# Patient Record
Sex: MALE | Race: WHITE | HISPANIC OR LATINO | Employment: FULL TIME | URBAN - METROPOLITAN AREA
[De-identification: names, ages, dates, MRNs, and addresses within clinical notes are randomized per-mention and may not be internally consistent; named-entity substitution may affect disease eponyms.]

---

## 2017-09-06 ENCOUNTER — HOSPITAL ENCOUNTER (OUTPATIENT)
Dept: LAB | Facility: MEDICAL CENTER | Age: 52
End: 2017-09-06
Attending: FAMILY MEDICINE
Payer: COMMERCIAL

## 2017-09-06 LAB
ALBUMIN SERPL BCP-MCNC: 3.8 G/DL (ref 3.2–4.9)
ALBUMIN/GLOB SERPL: 1 G/DL
ALP SERPL-CCNC: 50 U/L (ref 30–99)
ALT SERPL-CCNC: 18 U/L (ref 2–50)
ANION GAP SERPL CALC-SCNC: 8 MMOL/L (ref 0–11.9)
AST SERPL-CCNC: 21 U/L (ref 12–45)
BILIRUB SERPL-MCNC: 0.8 MG/DL (ref 0.1–1.5)
BUN SERPL-MCNC: 10 MG/DL (ref 8–22)
CALCIUM SERPL-MCNC: 9.5 MG/DL (ref 8.5–10.5)
CHLORIDE SERPL-SCNC: 106 MMOL/L (ref 96–112)
CHOLEST SERPL-MCNC: 147 MG/DL (ref 100–199)
CO2 SERPL-SCNC: 26 MMOL/L (ref 20–33)
CREAT SERPL-MCNC: 0.86 MG/DL (ref 0.5–1.4)
EST. AVERAGE GLUCOSE BLD GHB EST-MCNC: 114 MG/DL
GFR SERPL CREATININE-BSD FRML MDRD: >60 ML/MIN/1.73 M 2
GLOBULIN SER CALC-MCNC: 3.7 G/DL (ref 1.9–3.5)
GLUCOSE SERPL-MCNC: 82 MG/DL (ref 65–99)
HBA1C MFR BLD: 5.6 % (ref 0–5.6)
HDLC SERPL-MCNC: 58 MG/DL
LDLC SERPL CALC-MCNC: 76 MG/DL
POTASSIUM SERPL-SCNC: 4 MMOL/L (ref 3.6–5.5)
PROT SERPL-MCNC: 7.5 G/DL (ref 6–8.2)
SODIUM SERPL-SCNC: 140 MMOL/L (ref 135–145)
TRIGL SERPL-MCNC: 65 MG/DL (ref 0–149)

## 2017-09-06 PROCEDURE — 80053 COMPREHEN METABOLIC PANEL: CPT

## 2017-09-06 PROCEDURE — 80061 LIPID PANEL: CPT

## 2017-09-06 PROCEDURE — 36415 COLL VENOUS BLD VENIPUNCTURE: CPT

## 2017-09-06 PROCEDURE — 83036 HEMOGLOBIN GLYCOSYLATED A1C: CPT

## 2017-10-09 ENCOUNTER — HOSPITAL ENCOUNTER (OUTPATIENT)
Dept: RADIOLOGY | Facility: MEDICAL CENTER | Age: 52
End: 2017-10-09
Attending: FAMILY MEDICINE
Payer: COMMERCIAL

## 2017-10-09 DIAGNOSIS — J30.1 ACUTE SEASONAL ALLERGIC RHINITIS DUE TO POLLEN: ICD-10-CM

## 2017-10-09 DIAGNOSIS — M50.30 DEGENERATION OF CERVICAL INTERVERTEBRAL DISC: ICD-10-CM

## 2017-10-09 DIAGNOSIS — N40.1 BENIGN LOCALIZED HYPERPLASIA OF PROSTATE WITH URINARY OBSTRUCTION: ICD-10-CM

## 2017-10-09 DIAGNOSIS — E78.00 HYPERCHOLESTEROLEMIA: ICD-10-CM

## 2017-10-09 DIAGNOSIS — K64.8 INTERNAL HEMORRHOIDS: ICD-10-CM

## 2017-10-09 DIAGNOSIS — K21.9 GASTROESOPHAGEAL REFLUX DISEASE WITHOUT ESOPHAGITIS: ICD-10-CM

## 2017-10-09 DIAGNOSIS — N13.8 BENIGN LOCALIZED HYPERPLASIA OF PROSTATE WITH URINARY OBSTRUCTION: ICD-10-CM

## 2017-10-09 DIAGNOSIS — E55.9 VITAMIN D DEFICIENCY: ICD-10-CM

## 2017-10-09 DIAGNOSIS — Z83.3 FHX: DIABETES MELLITUS: ICD-10-CM

## 2017-10-09 PROCEDURE — 72040 X-RAY EXAM NECK SPINE 2-3 VW: CPT

## 2017-12-09 ENCOUNTER — HOSPITAL ENCOUNTER (OUTPATIENT)
Dept: LAB | Facility: MEDICAL CENTER | Age: 52
End: 2017-12-09
Attending: FAMILY MEDICINE
Payer: COMMERCIAL

## 2017-12-09 LAB — PSA SERPL-MCNC: 0.45 NG/ML (ref 0–4)

## 2017-12-09 PROCEDURE — 84153 ASSAY OF PSA TOTAL: CPT

## 2017-12-09 PROCEDURE — 36415 COLL VENOUS BLD VENIPUNCTURE: CPT

## 2018-06-16 ENCOUNTER — HOSPITAL ENCOUNTER (OUTPATIENT)
Dept: LAB | Facility: MEDICAL CENTER | Age: 53
End: 2018-06-16
Attending: FAMILY MEDICINE
Payer: COMMERCIAL

## 2018-06-16 LAB
ALBUMIN SERPL BCP-MCNC: 4.3 G/DL (ref 3.2–4.9)
ALBUMIN/GLOB SERPL: 1.1 G/DL
ALP SERPL-CCNC: 53 U/L (ref 30–99)
ALT SERPL-CCNC: 18 U/L (ref 2–50)
ANION GAP SERPL CALC-SCNC: 8 MMOL/L (ref 0–11.9)
APPEARANCE UR: CLEAR
AST SERPL-CCNC: 20 U/L (ref 12–45)
BASOPHILS # BLD AUTO: 0.5 % (ref 0–1.8)
BASOPHILS # BLD: 0.03 K/UL (ref 0–0.12)
BILIRUB SERPL-MCNC: 1 MG/DL (ref 0.1–1.5)
BILIRUB UR QL STRIP.AUTO: NEGATIVE
BUN SERPL-MCNC: 15 MG/DL (ref 8–22)
CALCIUM SERPL-MCNC: 9.9 MG/DL (ref 8.5–10.5)
CHLORIDE SERPL-SCNC: 105 MMOL/L (ref 96–112)
CHOLEST SERPL-MCNC: 169 MG/DL (ref 100–199)
CO2 SERPL-SCNC: 27 MMOL/L (ref 20–33)
COLOR UR: YELLOW
CREAT SERPL-MCNC: 0.88 MG/DL (ref 0.5–1.4)
EOSINOPHIL # BLD AUTO: 0.05 K/UL (ref 0–0.51)
EOSINOPHIL NFR BLD: 0.9 % (ref 0–6.9)
ERYTHROCYTE [DISTWIDTH] IN BLOOD BY AUTOMATED COUNT: 45.7 FL (ref 35.9–50)
ERYTHROCYTE [SEDIMENTATION RATE] IN BLOOD BY WESTERGREN METHOD: 8 MM/HOUR (ref 0–20)
EST. AVERAGE GLUCOSE BLD GHB EST-MCNC: 117 MG/DL
GLOBULIN SER CALC-MCNC: 3.8 G/DL (ref 1.9–3.5)
GLUCOSE SERPL-MCNC: 92 MG/DL (ref 65–99)
GLUCOSE UR STRIP.AUTO-MCNC: NEGATIVE MG/DL
HBA1C MFR BLD: 5.7 % (ref 0–5.6)
HCT VFR BLD AUTO: 45.8 % (ref 42–52)
HDLC SERPL-MCNC: 71 MG/DL
HGB BLD-MCNC: 15.1 G/DL (ref 14–18)
IMM GRANULOCYTES # BLD AUTO: 0.01 K/UL (ref 0–0.11)
IMM GRANULOCYTES NFR BLD AUTO: 0.2 % (ref 0–0.9)
KETONES UR STRIP.AUTO-MCNC: ABNORMAL MG/DL
LDLC SERPL CALC-MCNC: 87 MG/DL
LEUKOCYTE ESTERASE UR QL STRIP.AUTO: NEGATIVE
LYMPHOCYTES # BLD AUTO: 1.8 K/UL (ref 1–4.8)
LYMPHOCYTES NFR BLD: 31.9 % (ref 22–41)
MCH RBC QN AUTO: 31.3 PG (ref 27–33)
MCHC RBC AUTO-ENTMCNC: 33 G/DL (ref 33.7–35.3)
MCV RBC AUTO: 94.8 FL (ref 81.4–97.8)
MICRO URNS: ABNORMAL
MONOCYTES # BLD AUTO: 0.41 K/UL (ref 0–0.85)
MONOCYTES NFR BLD AUTO: 7.3 % (ref 0–13.4)
NEUTROPHILS # BLD AUTO: 3.34 K/UL (ref 1.82–7.42)
NEUTROPHILS NFR BLD: 59.2 % (ref 44–72)
NITRITE UR QL STRIP.AUTO: NEGATIVE
NRBC # BLD AUTO: 0 K/UL
NRBC BLD-RTO: 0 /100 WBC
PH UR STRIP.AUTO: 6.5 [PH]
PLATELET # BLD AUTO: 185 K/UL (ref 164–446)
PMV BLD AUTO: 11.7 FL (ref 9–12.9)
POTASSIUM SERPL-SCNC: 4.6 MMOL/L (ref 3.6–5.5)
PROT SERPL-MCNC: 8.1 G/DL (ref 6–8.2)
PROT UR QL STRIP: NEGATIVE MG/DL
RBC # BLD AUTO: 4.83 M/UL (ref 4.7–6.1)
RBC UR QL AUTO: NEGATIVE
SODIUM SERPL-SCNC: 140 MMOL/L (ref 135–145)
SP GR UR STRIP.AUTO: 1.02
TRIGL SERPL-MCNC: 57 MG/DL (ref 0–149)
TSH SERPL DL<=0.005 MIU/L-ACNC: 2.72 UIU/ML (ref 0.38–5.33)
URATE SERPL-MCNC: 5.3 MG/DL (ref 2.5–8.3)
UROBILINOGEN UR STRIP.AUTO-MCNC: 0.2 MG/DL
WBC # BLD AUTO: 5.6 K/UL (ref 4.8–10.8)

## 2018-06-16 PROCEDURE — 86200 CCP ANTIBODY: CPT

## 2018-06-16 PROCEDURE — 84550 ASSAY OF BLOOD/URIC ACID: CPT

## 2018-06-16 PROCEDURE — 83036 HEMOGLOBIN GLYCOSYLATED A1C: CPT

## 2018-06-16 PROCEDURE — 80053 COMPREHEN METABOLIC PANEL: CPT

## 2018-06-16 PROCEDURE — 36415 COLL VENOUS BLD VENIPUNCTURE: CPT

## 2018-06-16 PROCEDURE — 85025 COMPLETE CBC W/AUTO DIFF WBC: CPT

## 2018-06-16 PROCEDURE — 85652 RBC SED RATE AUTOMATED: CPT

## 2018-06-16 PROCEDURE — 81003 URINALYSIS AUTO W/O SCOPE: CPT

## 2018-06-16 PROCEDURE — 84443 ASSAY THYROID STIM HORMONE: CPT

## 2018-06-16 PROCEDURE — 80061 LIPID PANEL: CPT

## 2018-06-16 PROCEDURE — 86038 ANTINUCLEAR ANTIBODIES: CPT

## 2018-06-19 LAB
CCP IGG SERPL-ACNC: 14 UNITS (ref 0–19)
NUCLEAR IGG SER QL IA: DETECTED
NUCLEAR IGG TITR SER IF: ABNORMAL {TITER}

## 2018-09-15 ENCOUNTER — HOSPITAL ENCOUNTER (OUTPATIENT)
Dept: LAB | Facility: MEDICAL CENTER | Age: 53
End: 2018-09-15
Attending: FAMILY MEDICINE
Payer: COMMERCIAL

## 2018-09-15 PROCEDURE — 36415 COLL VENOUS BLD VENIPUNCTURE: CPT

## 2018-09-15 PROCEDURE — 86225 DNA ANTIBODY NATIVE: CPT

## 2018-09-15 PROCEDURE — 83516 IMMUNOASSAY NONANTIBODY: CPT | Mod: 91

## 2018-09-15 PROCEDURE — 86235 NUCLEAR ANTIGEN ANTIBODY: CPT

## 2018-09-18 LAB — DSDNA AB TITR SER CLIF: NORMAL {TITER}

## 2018-09-19 LAB
CENTROMERE IGG TITR SER IF: 14 AU/ML (ref 0–40)
CHROMATIN IGG SERPL-ACNC: 15 UNITS (ref 0–19)
ENA JO1 AB TITR SER: 0 AU/ML (ref 0–40)
ENA SCL70 IGG SER QL: 1 AU/ML (ref 0–40)
ENA SM IGG SER-ACNC: 0 AU/ML (ref 0–40)
ENA SS-B IGG SER IA-ACNC: 1 AU/ML (ref 0–40)
SSA52 R0ENA AB IGG Q0420: 6 AU/ML (ref 0–40)
SSA60 R0ENA AB IGG Q0419: 15 AU/ML (ref 0–40)
U1 SNRNP IGG SER QL: 0 AU/ML (ref 0–40)

## 2019-03-16 ENCOUNTER — HOSPITAL ENCOUNTER (OUTPATIENT)
Dept: LAB | Facility: MEDICAL CENTER | Age: 54
End: 2019-03-16
Attending: FAMILY MEDICINE
Payer: COMMERCIAL

## 2019-03-16 LAB
25(OH)D3 SERPL-MCNC: 33 NG/ML (ref 30–100)
ALBUMIN SERPL BCP-MCNC: 4.3 G/DL (ref 3.2–4.9)
ALBUMIN/GLOB SERPL: 1.3 G/DL
ALP SERPL-CCNC: 51 U/L (ref 30–99)
ALT SERPL-CCNC: 19 U/L (ref 2–50)
ANION GAP SERPL CALC-SCNC: 7 MMOL/L (ref 0–11.9)
AST SERPL-CCNC: 21 U/L (ref 12–45)
BASOPHILS # BLD AUTO: 0.5 % (ref 0–1.8)
BASOPHILS # BLD: 0.03 K/UL (ref 0–0.12)
BILIRUB SERPL-MCNC: 0.9 MG/DL (ref 0.1–1.5)
BUN SERPL-MCNC: 13 MG/DL (ref 8–22)
CALCIUM SERPL-MCNC: 9.7 MG/DL (ref 8.5–10.5)
CHLORIDE SERPL-SCNC: 103 MMOL/L (ref 96–112)
CO2 SERPL-SCNC: 28 MMOL/L (ref 20–33)
CREAT SERPL-MCNC: 0.88 MG/DL (ref 0.5–1.4)
EOSINOPHIL # BLD AUTO: 0.08 K/UL (ref 0–0.51)
EOSINOPHIL NFR BLD: 1.4 % (ref 0–6.9)
ERYTHROCYTE [DISTWIDTH] IN BLOOD BY AUTOMATED COUNT: 45.6 FL (ref 35.9–50)
GLOBULIN SER CALC-MCNC: 3.2 G/DL (ref 1.9–3.5)
GLUCOSE SERPL-MCNC: 87 MG/DL (ref 65–99)
HCT VFR BLD AUTO: 46.4 % (ref 42–52)
HGB BLD-MCNC: 15.1 G/DL (ref 14–18)
IMM GRANULOCYTES # BLD AUTO: 0.01 K/UL (ref 0–0.11)
IMM GRANULOCYTES NFR BLD AUTO: 0.2 % (ref 0–0.9)
LYMPHOCYTES # BLD AUTO: 2.12 K/UL (ref 1–4.8)
LYMPHOCYTES NFR BLD: 38 % (ref 22–41)
MCH RBC QN AUTO: 31.6 PG (ref 27–33)
MCHC RBC AUTO-ENTMCNC: 32.5 G/DL (ref 33.7–35.3)
MCV RBC AUTO: 97.1 FL (ref 81.4–97.8)
MONOCYTES # BLD AUTO: 0.48 K/UL (ref 0–0.85)
MONOCYTES NFR BLD AUTO: 8.6 % (ref 0–13.4)
NEUTROPHILS # BLD AUTO: 2.86 K/UL (ref 1.82–7.42)
NEUTROPHILS NFR BLD: 51.3 % (ref 44–72)
NRBC # BLD AUTO: 0 K/UL
NRBC BLD-RTO: 0 /100 WBC
PLATELET # BLD AUTO: 180 K/UL (ref 164–446)
PMV BLD AUTO: 11.7 FL (ref 9–12.9)
POTASSIUM SERPL-SCNC: 4.5 MMOL/L (ref 3.6–5.5)
PROT SERPL-MCNC: 7.5 G/DL (ref 6–8.2)
PSA SERPL-MCNC: 0.42 NG/ML (ref 0–4)
RBC # BLD AUTO: 4.78 M/UL (ref 4.7–6.1)
SODIUM SERPL-SCNC: 138 MMOL/L (ref 135–145)
TSH SERPL DL<=0.005 MIU/L-ACNC: 4.72 UIU/ML (ref 0.38–5.33)
WBC # BLD AUTO: 5.6 K/UL (ref 4.8–10.8)

## 2019-03-16 PROCEDURE — 84153 ASSAY OF PSA TOTAL: CPT

## 2019-03-16 PROCEDURE — 80053 COMPREHEN METABOLIC PANEL: CPT

## 2019-03-16 PROCEDURE — 84443 ASSAY THYROID STIM HORMONE: CPT

## 2019-03-16 PROCEDURE — 85025 COMPLETE CBC W/AUTO DIFF WBC: CPT

## 2019-03-16 PROCEDURE — 82306 VITAMIN D 25 HYDROXY: CPT

## 2019-03-16 PROCEDURE — 36415 COLL VENOUS BLD VENIPUNCTURE: CPT

## 2019-06-15 ENCOUNTER — HOSPITAL ENCOUNTER (OUTPATIENT)
Dept: LAB | Facility: MEDICAL CENTER | Age: 54
End: 2019-06-15
Attending: FAMILY MEDICINE
Payer: COMMERCIAL

## 2019-06-15 LAB
RHEUMATOID FACT SER IA-ACNC: <10 IU/ML (ref 0–14)
T3FREE SERPL-MCNC: 3.1 PG/ML (ref 2.4–4.2)
T4 FREE SERPL-MCNC: 0.73 NG/DL (ref 0.53–1.43)
THYROPEROXIDASE AB SERPL-ACNC: 0.3 IU/ML (ref 0–9)
URATE SERPL-MCNC: 5.1 MG/DL (ref 2.5–8.3)

## 2019-06-15 PROCEDURE — 86431 RHEUMATOID FACTOR QUANT: CPT

## 2019-06-15 PROCEDURE — 84481 FREE ASSAY (FT-3): CPT

## 2019-06-15 PROCEDURE — 86376 MICROSOMAL ANTIBODY EACH: CPT

## 2019-06-15 PROCEDURE — 86039 ANTINUCLEAR ANTIBODIES (ANA): CPT

## 2019-06-15 PROCEDURE — 84439 ASSAY OF FREE THYROXINE: CPT

## 2019-06-15 PROCEDURE — 84550 ASSAY OF BLOOD/URIC ACID: CPT

## 2019-06-15 PROCEDURE — 86200 CCP ANTIBODY: CPT

## 2019-06-15 PROCEDURE — 36415 COLL VENOUS BLD VENIPUNCTURE: CPT

## 2019-06-15 PROCEDURE — 86038 ANTINUCLEAR ANTIBODIES: CPT

## 2019-06-15 PROCEDURE — 85652 RBC SED RATE AUTOMATED: CPT

## 2019-06-17 LAB
CCP IGG SERPL-ACNC: 19 UNITS (ref 0–19)
ERYTHROCYTE [SEDIMENTATION RATE] IN BLOOD BY WESTERGREN METHOD: 5 MM/HOUR (ref 0–20)
NUCLEAR IGG SER QL IA: DETECTED

## 2019-06-18 LAB
ANA INTERPRETIVE COMMENT Q5143: ABNORMAL
ANA PATTERN Q5144: ABNORMAL
ANA TITER Q5145: ABNORMAL
ANTINUCLEAR ANTIBODY (ANA), HEP-2, IGG Q5142: DETECTED

## 2019-07-27 ENCOUNTER — HOSPITAL ENCOUNTER (OUTPATIENT)
Dept: LAB | Facility: MEDICAL CENTER | Age: 54
End: 2019-07-27
Attending: PHYSICIAN ASSISTANT
Payer: COMMERCIAL

## 2019-07-27 PROCEDURE — 36415 COLL VENOUS BLD VENIPUNCTURE: CPT

## 2019-07-27 PROCEDURE — 86235 NUCLEAR ANTIGEN ANTIBODY: CPT

## 2019-07-27 PROCEDURE — 83516 IMMUNOASSAY NONANTIBODY: CPT | Mod: 91

## 2019-07-27 PROCEDURE — 86225 DNA ANTIBODY NATIVE: CPT

## 2019-07-29 LAB
CENTROMERE IGG TITR SER IF: 10 AU/ML (ref 0–40)
CHROMATIN IGG SERPL-ACNC: 12 UNITS (ref 0–19)
DSDNA AB TITR SER CLIF: NORMAL {TITER}
ENA JO1 AB TITR SER: 0 AU/ML (ref 0–40)
ENA SCL70 IGG SER QL: 2 AU/ML (ref 0–40)
ENA SM IGG SER-ACNC: 1 AU/ML (ref 0–40)
ENA SS-B IGG SER IA-ACNC: 0 AU/ML (ref 0–40)
RIBOSOMAL P AB SER-ACNC: 2 AU/ML (ref 0–40)
SSA52 R0ENA AB IGG Q0420: 1 AU/ML (ref 0–40)
SSA60 R0ENA AB IGG Q0419: 1 AU/ML (ref 0–40)
U1 SNRNP IGG SER QL: 4 AU/ML (ref 0–40)

## 2019-10-24 ENCOUNTER — HOSPITAL ENCOUNTER (OUTPATIENT)
Dept: LAB | Facility: MEDICAL CENTER | Age: 54
End: 2019-10-24
Attending: FAMILY MEDICINE
Payer: COMMERCIAL

## 2019-10-24 LAB
EST. AVERAGE GLUCOSE BLD GHB EST-MCNC: 114 MG/DL
HBA1C MFR BLD: 5.6 % (ref 0–5.6)

## 2019-10-24 PROCEDURE — 83036 HEMOGLOBIN GLYCOSYLATED A1C: CPT

## 2019-10-24 PROCEDURE — 36415 COLL VENOUS BLD VENIPUNCTURE: CPT

## 2019-10-24 PROCEDURE — 80061 LIPID PANEL: CPT

## 2019-10-24 PROCEDURE — 83704 LIPOPROTEIN BLD QUAN PART: CPT

## 2019-10-27 LAB
CHOLEST SERPL-MCNC: 182 MG/DL
HDL PARTICAL NO Q4363: 34.3 UMOL/L
HDL SIZE Q4361: 9 NM
HDLC SERPL-MCNC: 60 MG/DL (ref 40–59)
HLD.LARGE SERPL-SCNC: 6.5 UMOL/L
L VLDL PART NO Q4357: 1.7 NMOL/L
LDL SERPL QN: 20.7 NM
LDL SERPL-SCNC: 1343 NMOL/L
LDL SMALL SERPL-SCNC: 525 NMOL/L
LDLC SERPL CALC-MCNC: 108 MG/DL
PATHOLOGY STUDY: ABNORMAL
TRIGL SERPL-MCNC: 68 MG/DL (ref 30–149)
VLDL SIZE Q4362: 43.6 NM

## 2019-12-10 ENCOUNTER — HOSPITAL ENCOUNTER (OUTPATIENT)
Dept: LAB | Facility: MEDICAL CENTER | Age: 54
End: 2019-12-10
Attending: STUDENT IN AN ORGANIZED HEALTH CARE EDUCATION/TRAINING PROGRAM
Payer: COMMERCIAL

## 2019-12-10 LAB
ALBUMIN SERPL BCP-MCNC: 4.3 G/DL (ref 3.2–4.9)
ALBUMIN/GLOB SERPL: 1.4 G/DL
ALP SERPL-CCNC: 53 U/L (ref 30–99)
ALT SERPL-CCNC: 19 U/L (ref 2–50)
ANION GAP SERPL CALC-SCNC: 8 MMOL/L (ref 0–11.9)
AST SERPL-CCNC: 20 U/L (ref 12–45)
BASOPHILS # BLD AUTO: 0.6 % (ref 0–1.8)
BASOPHILS # BLD: 0.03 K/UL (ref 0–0.12)
BILIRUB SERPL-MCNC: 0.7 MG/DL (ref 0.1–1.5)
BUN SERPL-MCNC: 12 MG/DL (ref 8–22)
CALCIUM SERPL-MCNC: 9.1 MG/DL (ref 8.5–10.5)
CHLORIDE SERPL-SCNC: 106 MMOL/L (ref 96–112)
CO2 SERPL-SCNC: 28 MMOL/L (ref 20–33)
CREAT SERPL-MCNC: 0.82 MG/DL (ref 0.5–1.4)
CRP SERPL HS-MCNC: 0.14 MG/DL (ref 0–0.75)
EOSINOPHIL # BLD AUTO: 0.05 K/UL (ref 0–0.51)
EOSINOPHIL NFR BLD: 1 % (ref 0–6.9)
ERYTHROCYTE [DISTWIDTH] IN BLOOD BY AUTOMATED COUNT: 44.7 FL (ref 35.9–50)
ERYTHROCYTE [SEDIMENTATION RATE] IN BLOOD BY WESTERGREN METHOD: 7 MM/HOUR (ref 0–20)
GLOBULIN SER CALC-MCNC: 3 G/DL (ref 1.9–3.5)
GLUCOSE SERPL-MCNC: 79 MG/DL (ref 65–99)
HCT VFR BLD AUTO: 45.7 % (ref 42–52)
HGB BLD-MCNC: 15 G/DL (ref 14–18)
IMM GRANULOCYTES # BLD AUTO: 0.01 K/UL (ref 0–0.11)
IMM GRANULOCYTES NFR BLD AUTO: 0.2 % (ref 0–0.9)
LYMPHOCYTES # BLD AUTO: 1.72 K/UL (ref 1–4.8)
LYMPHOCYTES NFR BLD: 33.9 % (ref 22–41)
MCH RBC QN AUTO: 31.6 PG (ref 27–33)
MCHC RBC AUTO-ENTMCNC: 32.8 G/DL (ref 33.7–35.3)
MCV RBC AUTO: 96.4 FL (ref 81.4–97.8)
MONOCYTES # BLD AUTO: 0.41 K/UL (ref 0–0.85)
MONOCYTES NFR BLD AUTO: 8.1 % (ref 0–13.4)
NEUTROPHILS # BLD AUTO: 2.85 K/UL (ref 1.82–7.42)
NEUTROPHILS NFR BLD: 56.2 % (ref 44–72)
NRBC # BLD AUTO: 0 K/UL
NRBC BLD-RTO: 0 /100 WBC
PLATELET # BLD AUTO: 188 K/UL (ref 164–446)
PMV BLD AUTO: 11.9 FL (ref 9–12.9)
POTASSIUM SERPL-SCNC: 4.1 MMOL/L (ref 3.6–5.5)
PROT SERPL-MCNC: 7.3 G/DL (ref 6–8.2)
RBC # BLD AUTO: 4.74 M/UL (ref 4.7–6.1)
SODIUM SERPL-SCNC: 142 MMOL/L (ref 135–145)
WBC # BLD AUTO: 5.1 K/UL (ref 4.8–10.8)

## 2019-12-10 PROCEDURE — 86140 C-REACTIVE PROTEIN: CPT

## 2019-12-10 PROCEDURE — 36415 COLL VENOUS BLD VENIPUNCTURE: CPT

## 2019-12-10 PROCEDURE — 85025 COMPLETE CBC W/AUTO DIFF WBC: CPT

## 2019-12-10 PROCEDURE — 85652 RBC SED RATE AUTOMATED: CPT

## 2019-12-10 PROCEDURE — 80053 COMPREHEN METABOLIC PANEL: CPT

## 2020-04-25 ENCOUNTER — HOSPITAL ENCOUNTER (OUTPATIENT)
Dept: LAB | Facility: MEDICAL CENTER | Age: 55
End: 2020-04-25
Attending: FAMILY MEDICINE
Payer: COMMERCIAL

## 2020-04-25 LAB
ALBUMIN SERPL BCP-MCNC: 4.4 G/DL (ref 3.2–4.9)
ALBUMIN/GLOB SERPL: 1.3 G/DL
ALP SERPL-CCNC: 63 U/L (ref 30–99)
ALT SERPL-CCNC: 30 U/L (ref 2–50)
ANION GAP SERPL CALC-SCNC: 13 MMOL/L (ref 7–16)
AST SERPL-CCNC: 25 U/L (ref 12–45)
BILIRUB SERPL-MCNC: 0.9 MG/DL (ref 0.1–1.5)
BUN SERPL-MCNC: 15 MG/DL (ref 8–22)
CALCIUM SERPL-MCNC: 9.4 MG/DL (ref 8.5–10.5)
CHLORIDE SERPL-SCNC: 103 MMOL/L (ref 96–112)
CHOLEST SERPL-MCNC: 202 MG/DL (ref 100–199)
CO2 SERPL-SCNC: 24 MMOL/L (ref 20–33)
CREAT SERPL-MCNC: 0.87 MG/DL (ref 0.5–1.4)
FASTING STATUS PATIENT QL REPORTED: NORMAL
GLOBULIN SER CALC-MCNC: 3.5 G/DL (ref 1.9–3.5)
GLUCOSE SERPL-MCNC: 92 MG/DL (ref 65–99)
HDLC SERPL-MCNC: 65 MG/DL
LDLC SERPL CALC-MCNC: 122 MG/DL
POTASSIUM SERPL-SCNC: 3.8 MMOL/L (ref 3.6–5.5)
PROT SERPL-MCNC: 7.9 G/DL (ref 6–8.2)
SODIUM SERPL-SCNC: 140 MMOL/L (ref 135–145)
TRIGL SERPL-MCNC: 74 MG/DL (ref 0–149)
TSH SERPL DL<=0.005 MIU/L-ACNC: 4.06 UIU/ML (ref 0.38–5.33)

## 2020-04-25 PROCEDURE — 80061 LIPID PANEL: CPT

## 2020-04-25 PROCEDURE — 84443 ASSAY THYROID STIM HORMONE: CPT

## 2020-04-25 PROCEDURE — 36415 COLL VENOUS BLD VENIPUNCTURE: CPT

## 2020-04-25 PROCEDURE — 80053 COMPREHEN METABOLIC PANEL: CPT

## 2020-10-22 ENCOUNTER — HOSPITAL ENCOUNTER (OUTPATIENT)
Dept: LAB | Facility: MEDICAL CENTER | Age: 55
End: 2020-10-22
Attending: FAMILY MEDICINE
Payer: COMMERCIAL

## 2020-10-22 LAB
ALBUMIN SERPL BCP-MCNC: 4.4 G/DL (ref 3.2–4.9)
ALBUMIN/GLOB SERPL: 1.4 G/DL
ALP SERPL-CCNC: 62 U/L (ref 30–99)
ALT SERPL-CCNC: 26 U/L (ref 2–50)
ANION GAP SERPL CALC-SCNC: 7 MMOL/L (ref 7–16)
AST SERPL-CCNC: 22 U/L (ref 12–45)
BILIRUB SERPL-MCNC: 0.6 MG/DL (ref 0.1–1.5)
BUN SERPL-MCNC: 15 MG/DL (ref 8–22)
CALCIUM SERPL-MCNC: 9.2 MG/DL (ref 8.5–10.5)
CHLORIDE SERPL-SCNC: 106 MMOL/L (ref 96–112)
CHOLEST SERPL-MCNC: 177 MG/DL (ref 100–199)
CO2 SERPL-SCNC: 27 MMOL/L (ref 20–33)
CREAT SERPL-MCNC: 0.86 MG/DL (ref 0.5–1.4)
EST. AVERAGE GLUCOSE BLD GHB EST-MCNC: 120 MG/DL
GLOBULIN SER CALC-MCNC: 3.2 G/DL (ref 1.9–3.5)
GLUCOSE SERPL-MCNC: 91 MG/DL (ref 65–99)
HBA1C MFR BLD: 5.8 % (ref 0–5.6)
HDLC SERPL-MCNC: 57 MG/DL
LDLC SERPL CALC-MCNC: 106 MG/DL
POTASSIUM SERPL-SCNC: 4.3 MMOL/L (ref 3.6–5.5)
PROT SERPL-MCNC: 7.6 G/DL (ref 6–8.2)
PSA SERPL-MCNC: 0.36 NG/ML (ref 0–4)
SODIUM SERPL-SCNC: 140 MMOL/L (ref 135–145)
TRIGL SERPL-MCNC: 72 MG/DL (ref 0–149)

## 2020-10-22 PROCEDURE — 84153 ASSAY OF PSA TOTAL: CPT

## 2020-10-22 PROCEDURE — 80053 COMPREHEN METABOLIC PANEL: CPT

## 2020-10-22 PROCEDURE — 83036 HEMOGLOBIN GLYCOSYLATED A1C: CPT

## 2020-10-22 PROCEDURE — 80061 LIPID PANEL: CPT

## 2020-10-22 PROCEDURE — 36415 COLL VENOUS BLD VENIPUNCTURE: CPT

## 2021-01-20 ENCOUNTER — HOSPITAL ENCOUNTER (OUTPATIENT)
Dept: LAB | Facility: MEDICAL CENTER | Age: 56
End: 2021-01-20
Attending: FAMILY MEDICINE
Payer: COMMERCIAL

## 2021-01-20 LAB
ALBUMIN SERPL BCP-MCNC: 4.2 G/DL (ref 3.2–4.9)
ALBUMIN/GLOB SERPL: 1.2 G/DL
ALP SERPL-CCNC: 66 U/L (ref 30–99)
ALT SERPL-CCNC: 27 U/L (ref 2–50)
ANION GAP SERPL CALC-SCNC: 7 MMOL/L (ref 7–16)
AST SERPL-CCNC: 28 U/L (ref 12–45)
BILIRUB SERPL-MCNC: 0.6 MG/DL (ref 0.1–1.5)
BUN SERPL-MCNC: 11 MG/DL (ref 8–22)
CALCIUM SERPL-MCNC: 9.4 MG/DL (ref 8.5–10.5)
CHLORIDE SERPL-SCNC: 103 MMOL/L (ref 96–112)
CHOLEST SERPL-MCNC: 169 MG/DL (ref 100–199)
CO2 SERPL-SCNC: 27 MMOL/L (ref 20–33)
CREAT SERPL-MCNC: 0.79 MG/DL (ref 0.5–1.4)
EST. AVERAGE GLUCOSE BLD GHB EST-MCNC: 111 MG/DL
FASTING STATUS PATIENT QL REPORTED: NORMAL
GLOBULIN SER CALC-MCNC: 3.6 G/DL (ref 1.9–3.5)
GLUCOSE SERPL-MCNC: 91 MG/DL (ref 65–99)
HBA1C MFR BLD: 5.5 % (ref 0–5.6)
HDLC SERPL-MCNC: 59 MG/DL
LDLC SERPL CALC-MCNC: 99 MG/DL
POTASSIUM SERPL-SCNC: 4 MMOL/L (ref 3.6–5.5)
PROT SERPL-MCNC: 7.8 G/DL (ref 6–8.2)
SODIUM SERPL-SCNC: 137 MMOL/L (ref 135–145)
TRIGL SERPL-MCNC: 57 MG/DL (ref 0–149)

## 2021-01-20 PROCEDURE — 80053 COMPREHEN METABOLIC PANEL: CPT

## 2021-01-20 PROCEDURE — 80061 LIPID PANEL: CPT

## 2021-01-20 PROCEDURE — 36415 COLL VENOUS BLD VENIPUNCTURE: CPT

## 2021-01-20 PROCEDURE — 83036 HEMOGLOBIN GLYCOSYLATED A1C: CPT

## 2021-04-22 ENCOUNTER — HOSPITAL ENCOUNTER (OUTPATIENT)
Dept: LAB | Facility: MEDICAL CENTER | Age: 56
End: 2021-04-22
Attending: STUDENT IN AN ORGANIZED HEALTH CARE EDUCATION/TRAINING PROGRAM
Payer: COMMERCIAL

## 2021-04-22 LAB
ALBUMIN SERPL BCP-MCNC: 4.3 G/DL (ref 3.2–4.9)
ALBUMIN/GLOB SERPL: 1.2 G/DL
ALP SERPL-CCNC: 66 U/L (ref 30–99)
ALT SERPL-CCNC: 24 U/L (ref 2–50)
ANION GAP SERPL CALC-SCNC: 5 MMOL/L (ref 7–16)
AST SERPL-CCNC: 25 U/L (ref 12–45)
BASOPHILS # BLD AUTO: 0.5 % (ref 0–1.8)
BASOPHILS # BLD: 0.03 K/UL (ref 0–0.12)
BILIRUB SERPL-MCNC: 0.9 MG/DL (ref 0.1–1.5)
BUN SERPL-MCNC: 11 MG/DL (ref 8–22)
CALCIUM SERPL-MCNC: 9.6 MG/DL (ref 8.5–10.5)
CHLORIDE SERPL-SCNC: 104 MMOL/L (ref 96–112)
CHOLEST SERPL-MCNC: 183 MG/DL (ref 100–199)
CO2 SERPL-SCNC: 28 MMOL/L (ref 20–33)
CREAT SERPL-MCNC: 0.79 MG/DL (ref 0.5–1.4)
EOSINOPHIL # BLD AUTO: 0.06 K/UL (ref 0–0.51)
EOSINOPHIL NFR BLD: 0.9 % (ref 0–6.9)
ERYTHROCYTE [DISTWIDTH] IN BLOOD BY AUTOMATED COUNT: 44.9 FL (ref 35.9–50)
EST. AVERAGE GLUCOSE BLD GHB EST-MCNC: 105 MG/DL
GLOBULIN SER CALC-MCNC: 3.7 G/DL (ref 1.9–3.5)
GLUCOSE SERPL-MCNC: 93 MG/DL (ref 65–99)
HBA1C MFR BLD: 5.3 % (ref 4–5.6)
HCT VFR BLD AUTO: 46.5 % (ref 42–52)
HDLC SERPL-MCNC: 55 MG/DL
HGB BLD-MCNC: 15.3 G/DL (ref 14–18)
IMM GRANULOCYTES # BLD AUTO: 0.02 K/UL (ref 0–0.11)
IMM GRANULOCYTES NFR BLD AUTO: 0.3 % (ref 0–0.9)
LDLC SERPL CALC-MCNC: 108 MG/DL
LYMPHOCYTES # BLD AUTO: 2.49 K/UL (ref 1–4.8)
LYMPHOCYTES NFR BLD: 39.3 % (ref 22–41)
MCH RBC QN AUTO: 31.2 PG (ref 27–33)
MCHC RBC AUTO-ENTMCNC: 32.9 G/DL (ref 33.7–35.3)
MCV RBC AUTO: 94.9 FL (ref 81.4–97.8)
MONOCYTES # BLD AUTO: 0.48 K/UL (ref 0–0.85)
MONOCYTES NFR BLD AUTO: 7.6 % (ref 0–13.4)
NEUTROPHILS # BLD AUTO: 3.25 K/UL (ref 1.82–7.42)
NEUTROPHILS NFR BLD: 51.4 % (ref 44–72)
NRBC # BLD AUTO: 0 K/UL
NRBC BLD-RTO: 0 /100 WBC
PLATELET # BLD AUTO: 180 K/UL (ref 164–446)
PMV BLD AUTO: 11.7 FL (ref 9–12.9)
POTASSIUM SERPL-SCNC: 4.6 MMOL/L (ref 3.6–5.5)
PROT SERPL-MCNC: 8 G/DL (ref 6–8.2)
PSA SERPL-MCNC: 0.38 NG/ML (ref 0–4)
RBC # BLD AUTO: 4.9 M/UL (ref 4.7–6.1)
SODIUM SERPL-SCNC: 137 MMOL/L (ref 135–145)
TRIGL SERPL-MCNC: 102 MG/DL (ref 0–149)
TSH SERPL DL<=0.005 MIU/L-ACNC: 5.53 UIU/ML (ref 0.38–5.33)
WBC # BLD AUTO: 6.3 K/UL (ref 4.8–10.8)

## 2021-04-22 PROCEDURE — 85025 COMPLETE CBC W/AUTO DIFF WBC: CPT

## 2021-04-22 PROCEDURE — 80053 COMPREHEN METABOLIC PANEL: CPT

## 2021-04-22 PROCEDURE — 82306 VITAMIN D 25 HYDROXY: CPT

## 2021-04-22 PROCEDURE — 84153 ASSAY OF PSA TOTAL: CPT

## 2021-04-22 PROCEDURE — 36415 COLL VENOUS BLD VENIPUNCTURE: CPT

## 2021-04-22 PROCEDURE — 84443 ASSAY THYROID STIM HORMONE: CPT

## 2021-04-22 PROCEDURE — 80061 LIPID PANEL: CPT

## 2021-04-22 PROCEDURE — 83036 HEMOGLOBIN GLYCOSYLATED A1C: CPT

## 2021-04-24 LAB — 25(OH)D3 SERPL-MCNC: 38 NG/ML (ref 30–80)

## 2021-06-04 ENCOUNTER — HOSPITAL ENCOUNTER (OUTPATIENT)
Dept: LAB | Facility: MEDICAL CENTER | Age: 56
End: 2021-06-04
Attending: FAMILY MEDICINE
Payer: COMMERCIAL

## 2021-06-04 LAB
T3FREE SERPL-MCNC: 3.53 PG/ML (ref 2–4.4)
T4 FREE SERPL-MCNC: 1.06 NG/DL (ref 0.93–1.7)
TSH SERPL DL<=0.005 MIU/L-ACNC: 3.52 UIU/ML (ref 0.38–5.33)

## 2021-06-04 PROCEDURE — 84481 FREE ASSAY (FT-3): CPT

## 2021-06-04 PROCEDURE — 84443 ASSAY THYROID STIM HORMONE: CPT

## 2021-06-04 PROCEDURE — 84439 ASSAY OF FREE THYROXINE: CPT

## 2021-06-04 PROCEDURE — 36415 COLL VENOUS BLD VENIPUNCTURE: CPT

## 2021-10-20 ENCOUNTER — HOSPITAL ENCOUNTER (OUTPATIENT)
Dept: LAB | Facility: MEDICAL CENTER | Age: 56
End: 2021-10-20
Attending: STUDENT IN AN ORGANIZED HEALTH CARE EDUCATION/TRAINING PROGRAM
Payer: COMMERCIAL

## 2021-10-20 LAB
ALBUMIN SERPL BCP-MCNC: 4.1 G/DL (ref 3.2–4.9)
ALBUMIN/GLOB SERPL: 1.1 G/DL
ALP SERPL-CCNC: 68 U/L (ref 30–99)
ALT SERPL-CCNC: 24 U/L (ref 2–50)
ANION GAP SERPL CALC-SCNC: 9 MMOL/L (ref 7–16)
AST SERPL-CCNC: 24 U/L (ref 12–45)
BILIRUB SERPL-MCNC: 0.5 MG/DL (ref 0.1–1.5)
BUN SERPL-MCNC: 11 MG/DL (ref 8–22)
CALCIUM SERPL-MCNC: 9.5 MG/DL (ref 8.5–10.5)
CHLORIDE SERPL-SCNC: 105 MMOL/L (ref 96–112)
CO2 SERPL-SCNC: 26 MMOL/L (ref 20–33)
CREAT SERPL-MCNC: 0.82 MG/DL (ref 0.5–1.4)
GLOBULIN SER CALC-MCNC: 3.6 G/DL (ref 1.9–3.5)
GLUCOSE SERPL-MCNC: 89 MG/DL (ref 65–99)
POTASSIUM SERPL-SCNC: 4.3 MMOL/L (ref 3.6–5.5)
PROT SERPL-MCNC: 7.7 G/DL (ref 6–8.2)
SODIUM SERPL-SCNC: 140 MMOL/L (ref 135–145)

## 2021-10-20 PROCEDURE — 80053 COMPREHEN METABOLIC PANEL: CPT

## 2021-10-20 PROCEDURE — 36415 COLL VENOUS BLD VENIPUNCTURE: CPT

## 2022-01-20 ENCOUNTER — HOSPITAL ENCOUNTER (OUTPATIENT)
Dept: LAB | Facility: MEDICAL CENTER | Age: 57
End: 2022-01-20
Attending: FAMILY MEDICINE
Payer: COMMERCIAL

## 2022-01-20 LAB
ALBUMIN SERPL BCP-MCNC: 4.1 G/DL (ref 3.2–4.9)
ALBUMIN/GLOB SERPL: 1.2 G/DL
ALP SERPL-CCNC: 67 U/L (ref 30–99)
ALT SERPL-CCNC: 25 U/L (ref 2–50)
ANION GAP SERPL CALC-SCNC: 9 MMOL/L (ref 7–16)
AST SERPL-CCNC: 25 U/L (ref 12–45)
BASOPHILS # BLD AUTO: 0.5 % (ref 0–1.8)
BASOPHILS # BLD: 0.03 K/UL (ref 0–0.12)
BILIRUB SERPL-MCNC: 0.7 MG/DL (ref 0.1–1.5)
BUN SERPL-MCNC: 10 MG/DL (ref 8–22)
CALCIUM SERPL-MCNC: 9.4 MG/DL (ref 8.5–10.5)
CHLORIDE SERPL-SCNC: 106 MMOL/L (ref 96–112)
CHOLEST SERPL-MCNC: 174 MG/DL (ref 100–199)
CO2 SERPL-SCNC: 24 MMOL/L (ref 20–33)
CREAT SERPL-MCNC: 0.9 MG/DL (ref 0.5–1.4)
EOSINOPHIL # BLD AUTO: 0.04 K/UL (ref 0–0.51)
EOSINOPHIL NFR BLD: 0.7 % (ref 0–6.9)
ERYTHROCYTE [DISTWIDTH] IN BLOOD BY AUTOMATED COUNT: 45.3 FL (ref 35.9–50)
EST. AVERAGE GLUCOSE BLD GHB EST-MCNC: 114 MG/DL
GLOBULIN SER CALC-MCNC: 3.5 G/DL (ref 1.9–3.5)
GLUCOSE SERPL-MCNC: 92 MG/DL (ref 65–99)
HBA1C MFR BLD: 5.6 % (ref 4–5.6)
HCT VFR BLD AUTO: 45.8 % (ref 42–52)
HDLC SERPL-MCNC: 60 MG/DL
HGB BLD-MCNC: 15.3 G/DL (ref 14–18)
IMM GRANULOCYTES # BLD AUTO: 0.01 K/UL (ref 0–0.11)
IMM GRANULOCYTES NFR BLD AUTO: 0.2 % (ref 0–0.9)
LDLC SERPL CALC-MCNC: 104 MG/DL
LYMPHOCYTES # BLD AUTO: 2.23 K/UL (ref 1–4.8)
LYMPHOCYTES NFR BLD: 39.3 % (ref 22–41)
MCH RBC QN AUTO: 31.8 PG (ref 27–33)
MCHC RBC AUTO-ENTMCNC: 33.4 G/DL (ref 33.7–35.3)
MCV RBC AUTO: 95.2 FL (ref 81.4–97.8)
MONOCYTES # BLD AUTO: 0.42 K/UL (ref 0–0.85)
MONOCYTES NFR BLD AUTO: 7.4 % (ref 0–13.4)
NEUTROPHILS # BLD AUTO: 2.95 K/UL (ref 1.82–7.42)
NEUTROPHILS NFR BLD: 51.9 % (ref 44–72)
NRBC # BLD AUTO: 0 K/UL
NRBC BLD-RTO: 0 /100 WBC
PLATELET # BLD AUTO: 183 K/UL (ref 164–446)
PMV BLD AUTO: 11.7 FL (ref 9–12.9)
POTASSIUM SERPL-SCNC: 4.3 MMOL/L (ref 3.6–5.5)
PROT SERPL-MCNC: 7.6 G/DL (ref 6–8.2)
RBC # BLD AUTO: 4.81 M/UL (ref 4.7–6.1)
SODIUM SERPL-SCNC: 139 MMOL/L (ref 135–145)
TRIGL SERPL-MCNC: 52 MG/DL (ref 0–149)
WBC # BLD AUTO: 5.7 K/UL (ref 4.8–10.8)

## 2022-01-20 PROCEDURE — 85025 COMPLETE CBC W/AUTO DIFF WBC: CPT

## 2022-01-20 PROCEDURE — 36415 COLL VENOUS BLD VENIPUNCTURE: CPT

## 2022-01-20 PROCEDURE — 80061 LIPID PANEL: CPT

## 2022-01-20 PROCEDURE — 83036 HEMOGLOBIN GLYCOSYLATED A1C: CPT

## 2022-01-20 PROCEDURE — 80053 COMPREHEN METABOLIC PANEL: CPT

## 2022-02-10 ENCOUNTER — HOSPITAL ENCOUNTER (OUTPATIENT)
Dept: LAB | Facility: MEDICAL CENTER | Age: 57
End: 2022-02-10
Attending: FAMILY MEDICINE
Payer: COMMERCIAL

## 2022-02-10 LAB — 25(OH)D3 SERPL-MCNC: 38 NG/ML (ref 30–100)

## 2022-02-10 PROCEDURE — 84403 ASSAY OF TOTAL TESTOSTERONE: CPT

## 2022-02-10 PROCEDURE — 36415 COLL VENOUS BLD VENIPUNCTURE: CPT

## 2022-02-10 PROCEDURE — 84270 ASSAY OF SEX HORMONE GLOBUL: CPT

## 2022-02-10 PROCEDURE — 82306 VITAMIN D 25 HYDROXY: CPT

## 2022-02-10 PROCEDURE — 84402 ASSAY OF FREE TESTOSTERONE: CPT

## 2022-02-14 LAB
SHBG SERPL-SCNC: 42 NMOL/L (ref 11–80)
TESTOST FREE MFR SERPL: 1.7 % (ref 1.6–2.9)
TESTOST FREE SERPL-MCNC: 96 PG/ML (ref 47–244)
TESTOST SERPL-MCNC: 568 NG/DL (ref 300–890)

## 2022-05-20 ENCOUNTER — OFFICE VISIT (OUTPATIENT)
Dept: INTERNAL MEDICINE | Facility: OTHER | Age: 57
End: 2022-05-20
Payer: COMMERCIAL

## 2022-05-20 VITALS
OXYGEN SATURATION: 98 % | HEART RATE: 61 BPM | DIASTOLIC BLOOD PRESSURE: 75 MMHG | TEMPERATURE: 97.9 F | BODY MASS INDEX: 22.73 KG/M2 | HEIGHT: 65 IN | SYSTOLIC BLOOD PRESSURE: 125 MMHG | WEIGHT: 136.4 LBS

## 2022-05-20 DIAGNOSIS — R35.1 BPH ASSOCIATED WITH NOCTURIA: ICD-10-CM

## 2022-05-20 DIAGNOSIS — N40.1 BPH ASSOCIATED WITH NOCTURIA: ICD-10-CM

## 2022-05-20 DIAGNOSIS — M79.10 MUSCLE TENSION PAIN: ICD-10-CM

## 2022-05-20 PROCEDURE — 99204 OFFICE O/P NEW MOD 45 MIN: CPT | Mod: GC | Performed by: STUDENT IN AN ORGANIZED HEALTH CARE EDUCATION/TRAINING PROGRAM

## 2022-05-20 RX ORDER — DUTASTERIDE 0.5 MG/1
0.5 CAPSULE, LIQUID FILLED ORAL DAILY
COMMUNITY
End: 2022-11-16 | Stop reason: SDUPTHER

## 2022-05-20 RX ORDER — NORTRIPTYLINE HYDROCHLORIDE 10 MG/1
10 CAPSULE ORAL NIGHTLY
COMMUNITY
End: 2022-05-20

## 2022-05-20 RX ORDER — CYCLOBENZAPRINE HCL 5 MG
5 TABLET ORAL 2 TIMES DAILY PRN
Qty: 30 TABLET | Refills: 1 | Status: SHIPPED | OUTPATIENT
Start: 2022-05-20 | End: 2022-08-16

## 2022-05-20 RX ORDER — TAMSULOSIN HYDROCHLORIDE 0.4 MG/1
0.4 CAPSULE ORAL DAILY
COMMUNITY
End: 2022-08-16 | Stop reason: SDUPTHER

## 2022-05-20 ASSESSMENT — ENCOUNTER SYMPTOMS
MYALGIAS: 1
DIZZINESS: 0
BLOOD IN STOOL: 0
SORE THROAT: 0
SPEECH CHANGE: 0
INSOMNIA: 1
SHORTNESS OF BREATH: 0
CHILLS: 0
HEMOPTYSIS: 0
HEARTBURN: 0
BLURRED VISION: 0
BACK PAIN: 0
HALLUCINATIONS: 0
COUGH: 0
NERVOUS/ANXIOUS: 0
SPUTUM PRODUCTION: 0
FEVER: 0
ORTHOPNEA: 0
SINUS PAIN: 0
ABDOMINAL PAIN: 0
VOMITING: 0
HEADACHES: 0
DEPRESSION: 0
NECK PAIN: 1
PHOTOPHOBIA: 0
PALPITATIONS: 0
FOCAL WEAKNESS: 0
DOUBLE VISION: 0
WEIGHT LOSS: 0
BRUISES/BLEEDS EASILY: 0
EYE PAIN: 0
NAUSEA: 0
TINGLING: 0
SENSORY CHANGE: 0

## 2022-05-20 ASSESSMENT — PATIENT HEALTH QUESTIONNAIRE - PHQ9: CLINICAL INTERPRETATION OF PHQ2 SCORE: 0

## 2022-05-20 ASSESSMENT — FIBROSIS 4 INDEX: FIB4 SCORE: 1.61

## 2022-05-20 NOTE — PATIENT INSTRUCTIONS
- Continue Flomax and Dutasteride  - Stop Nortriptyline  - Start Cyclobenzaphrine 5 mg at bed time  - Voltaren cream apply twice a day  - Stretching  - Follow up in 6 weeks

## 2022-05-20 NOTE — PROGRESS NOTES
New Patient    Hero Jamil is a 57 y.o. male who presents today with the following:    CC: Establish Care with Primary Care Physician     HPI:    Mr. Osborne is a 58 y/o male with known past medical history of BPH who presented today to establish care. He is currently taking Flomax 0.4 mg daily and Dutasteride 0.5 mg daily. He follows up with Urology most recent visit 3 months ago, he stated that had lab work which included PSA, he can not recall exact results but believes around 3.0.  Today patient states that continues experiencing nocturia on average twice.  He also had a complaint of chronic muscle pain in his neck muscles and his trapezius muscles, this has been going on for several months, he describes the pain like sensation of tightness and sometimes achy. He works as a  and recalls significant repetitive use of his arms and shoulders which could be a precipitating factor. He was given Nortriptyline by his urologist few months ago for this issue but patient has not noticed any improvement at all.       ROS:       Review of Systems   Constitutional: Negative for chills, fever, malaise/fatigue and weight loss.   HENT: Negative for nosebleeds, sinus pain, sore throat and tinnitus.    Eyes: Negative for blurred vision, double vision, photophobia and pain.   Respiratory: Negative for cough, hemoptysis, sputum production and shortness of breath.    Cardiovascular: Negative for chest pain, palpitations, orthopnea and leg swelling.   Gastrointestinal: Negative for abdominal pain, blood in stool, heartburn, melena, nausea and vomiting.   Genitourinary: Negative for dysuria, frequency, hematuria and urgency.   Musculoskeletal: Positive for myalgias and neck pain. Negative for back pain and joint pain.   Skin: Negative for rash.   Neurological: Negative for dizziness, tingling, sensory change, speech change, focal weakness and headaches.   Endo/Heme/Allergies: Does not bruise/bleed easily.  "  Psychiatric/Behavioral: Negative for depression, hallucinations and suicidal ideas. The patient has insomnia. The patient is not nervous/anxious.          No past medical history on file.    No past surgical history on file.    No family history on file.    Social History     Tobacco Use   • Smoking status: Never Smoker   • Smokeless tobacco: Never Used   Substance Use Topics   • Alcohol use: Never       Current Outpatient Medications   Medication Sig Dispense Refill   • tamsulosin (FLOMAX) 0.4 MG capsule Take 0.4 mg by mouth every day.     • dutasteride (AVODART) 0.5 MG capsule Take 0.5 mg by mouth every day.     • cyclobenzaprine (FLEXERIL) 5 mg tablet Take 1 Tablet by mouth 2 times a day as needed for Muscle Spasms. 30 Tablet 1     No current facility-administered medications for this visit.       Physical Exam:  BP (!) 156/89 (BP Location: Left arm, Patient Position: Sitting, BP Cuff Size: Adult)   Pulse 61   Temp 36.6 °C (97.9 °F) (Temporal)   Ht 1.651 m (5' 5\")   Wt 61.9 kg (136 lb 6.4 oz)   SpO2 98%   BMI 22.70 kg/m²      General: Well-developed, well-nourished male in no distress  HEENT: Conjuntiva and lids are within normal limits.  External auditory canals are within normal limits.  Tympanic membranes are clear with a normal light refflex.  Nasal mucosa is normal.  Oral pharynx is normal and free of exudate.  Neck: Supple, non-tender with nothyromegaly noted.  Lympatic: Pre and Post auricular, sub-mandibular, anterior and posterior cervical and supraclavicular areas are without notable lymphadenopathy  Lungs: Clear to auscultation and perucssion bilaterally with good respiratory effort.  No wheezes, crackes or rhonci are heard.  Heart: Normal rate, regular rhythm with no murmurs, rubs or gallops heard.  Abdomen: Soft, non-tender, non-distended with normal-active bowel sounds.  Nor organomegaly noted.  Extremites: No edema  Psych: Patient is awake, alert and oriented with recent and remote memory " intact. Mood and affect are normal.       Assessment and Plan:     1. BPH associated with nocturia  - Follows up with Urology  - On Flomax and Avodart  - Nocturia improved  - Recent PSA around 3 records not available  - Continue same therapy  - Make sure do not drink fluids late in the evening and at night prior going to bed  - Also no coffee or ETOH at night or evening    2. Muscle tension pain  - Neck and trapezius pain for several months  - Likely related to occupation () repetitive physical job  - Was given Nortriptyline by Urologist 3 months ago but states no improvement   - Denied headaches or recent trauma         PLAN:  - Stop Nortriptyline   - Start Cyclobenzaprine 5 mg BID  - Voltaren gel BID  - Avoid machinery use if drowsy feeling  - OK PRN tylenol  - Follow up in 6 weeks        Problem List Items Addressed This Visit    None     Visit Diagnoses     BPH associated with nocturia        Muscle tension pain        Relevant Medications    cyclobenzaprine (FLEXERIL) 5 mg tablet          Return in about 6 weeks (around 7/1/2022).    Patient Instructions   - Continue Flomax and Dutasteride  - Stop Nortriptyline  - Start Cyclobenzaphrine 5 mg at bed time  - Voltaren cream apply twice a day  - Stretching  - Follow up in 6 weeks      Signed by: Jama Freeman M.D.      Dr. Pete M.D. PGY-2  [facility]

## 2022-08-16 ENCOUNTER — OFFICE VISIT (OUTPATIENT)
Dept: INTERNAL MEDICINE | Facility: OTHER | Age: 57
End: 2022-08-16
Payer: COMMERCIAL

## 2022-08-16 VITALS
DIASTOLIC BLOOD PRESSURE: 73 MMHG | HEART RATE: 75 BPM | SYSTOLIC BLOOD PRESSURE: 135 MMHG | BODY MASS INDEX: 22.66 KG/M2 | TEMPERATURE: 98.1 F | HEIGHT: 65 IN | WEIGHT: 136 LBS | OXYGEN SATURATION: 98 %

## 2022-08-16 DIAGNOSIS — M79.10 MUSCLE TENSION PAIN: ICD-10-CM

## 2022-08-16 DIAGNOSIS — R35.1 BENIGN PROSTATIC HYPERPLASIA WITH NOCTURIA: ICD-10-CM

## 2022-08-16 DIAGNOSIS — N40.1 BENIGN PROSTATIC HYPERPLASIA WITH NOCTURIA: ICD-10-CM

## 2022-08-16 PROCEDURE — 99213 OFFICE O/P EST LOW 20 MIN: CPT | Mod: GE | Performed by: STUDENT IN AN ORGANIZED HEALTH CARE EDUCATION/TRAINING PROGRAM

## 2022-08-16 RX ORDER — TAMSULOSIN HYDROCHLORIDE 0.4 MG/1
0.4 CAPSULE ORAL DAILY
Qty: 90 CAPSULE | Refills: 3 | Status: SHIPPED | OUTPATIENT
Start: 2022-08-16 | End: 2023-05-01 | Stop reason: SDUPTHER

## 2022-08-16 ASSESSMENT — ENCOUNTER SYMPTOMS
SPEECH CHANGE: 0
TINGLING: 0
NECK PAIN: 0
SPUTUM PRODUCTION: 0
HEARTBURN: 0
BACK PAIN: 0
SINUS PAIN: 0
SENSORY CHANGE: 0
CONSTIPATION: 0
HALLUCINATIONS: 0
SHORTNESS OF BREATH: 0
DIZZINESS: 0
FEVER: 0
PALPITATIONS: 0
SORE THROAT: 0
FOCAL WEAKNESS: 0
PHOTOPHOBIA: 0
MYALGIAS: 0
WEAKNESS: 0
COUGH: 0
CLAUDICATION: 0
BLOOD IN STOOL: 0
FLANK PAIN: 0
ABDOMINAL PAIN: 0
HEMOPTYSIS: 0
WEIGHT LOSS: 0
HEADACHES: 0
DOUBLE VISION: 0
NAUSEA: 0
VOMITING: 0
BLURRED VISION: 0
DEPRESSION: 0
WHEEZING: 0
BRUISES/BLEEDS EASILY: 0
ORTHOPNEA: 0
CHILLS: 0
NERVOUS/ANXIOUS: 0

## 2022-08-16 ASSESSMENT — LIFESTYLE VARIABLES: SUBSTANCE_ABUSE: 0

## 2022-08-16 ASSESSMENT — FIBROSIS 4 INDEX: FIB4 SCORE: 1.61

## 2022-08-16 NOTE — PATIENT INSTRUCTIONS
- Continue flomax, take it in the afternoon.  - Continue Dutasteride (buys it in [place])  - Elmer PRN  - Regular exercise and stretching  - Follow up in 6 months

## 2022-08-16 NOTE — PROGRESS NOTES
Established Patient    Patient Care Team:  Jama Freeman M.D. as PCP - General (Internal Medicine)    HPI:  Hero Jamil is a 57 y.o. male who presents today with the following Chief Complaint(s):   Chief Complaint   Patient presents with    Follow-Up     Hero is here for follow up for muscle tension pain likely sec to overuse, he works as  and is constantly making repetitive movements on uncomfortable position.  Prior to see me his Urologist prescribed Amytriptyline for this type of pain but I told him to discontinue it and prescribed Voltaren cream and flexeryl for 2 months.  Today he states feeling well, no pain at all, but stated that here and there experiences mild tension responding well to Voltaren cream and hot compresses.    Review of Systems   Review of Systems   Constitutional:  Negative for chills, fever, malaise/fatigue and weight loss.   HENT:  Negative for congestion, ear discharge, nosebleeds, sinus pain, sore throat and tinnitus.    Eyes:  Negative for blurred vision, double vision and photophobia.   Respiratory:  Negative for cough, hemoptysis, sputum production, shortness of breath and wheezing.    Cardiovascular:  Negative for chest pain, palpitations, orthopnea, claudication and leg swelling.   Gastrointestinal:  Negative for abdominal pain, blood in stool, constipation, heartburn, melena, nausea and vomiting.   Genitourinary:  Negative for dysuria, flank pain, frequency, hematuria and urgency.   Musculoskeletal:  Negative for back pain, joint pain, myalgias and neck pain.   Skin:  Negative for itching and rash.   Neurological:  Negative for dizziness, tingling, sensory change, speech change, focal weakness, weakness and headaches.   Endo/Heme/Allergies:  Negative for environmental allergies. Does not bruise/bleed easily.   Psychiatric/Behavioral:  Negative for depression, hallucinations, substance abuse and suicidal ideas. The patient is not nervous/anxious.   "      History reviewed. No pertinent past medical history.    Patient Active Problem List    Diagnosis Date Noted    Benign prostatic hyperplasia with nocturia 05/20/2022       Allergies:Patient has no known allergies.    Current Outpatient Medications   Medication Sig Dispense Refill    tamsulosin (FLOMAX) 0.4 MG capsule Take 1 Capsule by mouth every day. 90 Capsule 3    dutasteride (AVODART) 0.5 MG capsule Take 0.5 mg by mouth every day.       No current facility-administered medications for this visit.       Social History     Tobacco Use    Smoking status: Never    Smokeless tobacco: Never   Vaping Use    Vaping Use: Never used   Substance Use Topics    Alcohol use: Never    Drug use: Never       History reviewed. No pertinent family history.      Physical Exam  Vitals:  /73 (BP Location: Right arm, Patient Position: Sitting, BP Cuff Size: Small adult)   Pulse 75   Temp 36.7 °C (98.1 °F) (Temporal)   Ht 1.651 m (5' 5\")   Wt 61.7 kg (136 lb)   SpO2 98%  Body mass index is 22.63 kg/m².    Constitutional:  Not in acute distress, well appearing.  HEENT:   NC/AT, PERRLA, NEOM  Cardiovascular: Regular rate and rhythm. No murmurs or gallops.      Lungs:   Clear to auscultation bilaterally. No wheezes or crackles. No respiratory distress.  Abdomen: Not distended, soft, not tender. No guarding or rigidity. No masses.  Extremities:  No cyanosis/clubbing/edema. No obvious deformities.  Skin:  Warm and dry.  No visible rashes.  Neurologic: Alert & oriented x 3, CN II-XII grossly intact, strength and sensation grossly intact.  No focal deficits noted.  Psychiatric:  Affect normal, mood normal, judgment normal.      Assessment and Plan:     Muscle tension pain (neck)  - Resolved  - Previous visit we stopped Amytriptyline and started him on Voltaren crean and Flexeryl (2 months only)  - Today no complaints (pain resolved)  - Has had occasional mild pain very sporadically responding to Voltaren  and hot " compresses.        PLAN:  - PRN Voltaren if recurrence, also apply hot compresses  - Stretching daily if possible  - Call office if worse       BPH  - Takes Flomax and Dutasteride  - Last visit he change timing of medication to late afternoon rather than am also we discussed importance of not drinking excessively late in the evening or before bed  - Patient states improved Nocturia he gets up once or twice at night and not daily  - He buys Dutasteride in [place] because here is expensive       Plan:  - Refilled Flomax  - Continue also Dutasteride    - Follow up in 6 months        Return in about 6 months (around 2/16/2023).        Please note that this dictation was created using voice recognition software. I have made every reasonable attempt to correct obvious errors, but I expect that there are errors of grammar and possibly content that I did not discover before finalizing the note.    Total face-to-face time spent in medical decision making:    Dr. Pete M.D. PGY-3  [facility]

## 2022-11-16 ENCOUNTER — OFFICE VISIT (OUTPATIENT)
Dept: INTERNAL MEDICINE | Facility: OTHER | Age: 57
End: 2022-11-16
Payer: COMMERCIAL

## 2022-11-16 VITALS
HEIGHT: 65 IN | HEART RATE: 70 BPM | SYSTOLIC BLOOD PRESSURE: 145 MMHG | TEMPERATURE: 98.1 F | BODY MASS INDEX: 22.99 KG/M2 | OXYGEN SATURATION: 98 % | DIASTOLIC BLOOD PRESSURE: 79 MMHG | WEIGHT: 138 LBS

## 2022-11-16 DIAGNOSIS — N40.1 BPH ASSOCIATED WITH NOCTURIA: ICD-10-CM

## 2022-11-16 DIAGNOSIS — N20.0 KIDNEY STONE ON RIGHT SIDE: ICD-10-CM

## 2022-11-16 DIAGNOSIS — Z00.00 HEALTHCARE MAINTENANCE: ICD-10-CM

## 2022-11-16 DIAGNOSIS — R35.1 BPH ASSOCIATED WITH NOCTURIA: ICD-10-CM

## 2022-11-16 PROCEDURE — 99213 OFFICE O/P EST LOW 20 MIN: CPT | Mod: GE | Performed by: STUDENT IN AN ORGANIZED HEALTH CARE EDUCATION/TRAINING PROGRAM

## 2022-11-16 RX ORDER — DUTASTERIDE 0.5 MG/1
0.5 CAPSULE, LIQUID FILLED ORAL DAILY
Qty: 30 CAPSULE | Refills: 3 | Status: SHIPPED | OUTPATIENT
Start: 2022-11-16 | End: 2023-05-01 | Stop reason: SDUPTHER

## 2022-11-16 ASSESSMENT — ENCOUNTER SYMPTOMS
BLOOD IN STOOL: 0
FLANK PAIN: 1
TINGLING: 0
VOMITING: 0
PALPITATIONS: 0
FEVER: 0
BACK PAIN: 0
HEADACHES: 0
ABDOMINAL PAIN: 0
WEIGHT LOSS: 0
SHORTNESS OF BREATH: 0
HEMOPTYSIS: 0
HEARTBURN: 0
NECK PAIN: 0
CHILLS: 0
SPUTUM PRODUCTION: 0
BRUISES/BLEEDS EASILY: 0
SENSORY CHANGE: 0
DOUBLE VISION: 0
COUGH: 0
PHOTOPHOBIA: 0
NERVOUS/ANXIOUS: 0
HALLUCINATIONS: 0
DIZZINESS: 0
NAUSEA: 0
ORTHOPNEA: 0
BLURRED VISION: 0
DEPRESSION: 0
MYALGIAS: 0
SINUS PAIN: 0
SORE THROAT: 0
FOCAL WEAKNESS: 0
SPEECH CHANGE: 0
TREMORS: 0

## 2022-11-16 ASSESSMENT — FIBROSIS 4 INDEX: FIB4 SCORE: 1.61

## 2022-11-16 ASSESSMENT — LIFESTYLE VARIABLES: SUBSTANCE_ABUSE: 0

## 2022-11-16 NOTE — PATIENT INSTRUCTIONS
- Continue flomax and dutasteride  - Avoid water at bed time  - Avoid meat, high salt, carbonated drinks, sugary drinks, avoid calcium or vit c supplements.  - Call me if symptoms worsen   - Follow up in 6 months  - Basic labs prior next visit

## 2022-11-16 NOTE — PROGRESS NOTES
Established Patient    Patient Care Team:  Jama Freeman M.D. as PCP - General (Internal Medicine)    HPI:  Hero Jamil is a 57 y.o. male who presents today with the following Chief Complaint(s):   Chief Complaint   Patient presents with    Painful Urination     X 1 week     Mr. Osborne comes today for acute visit for acute onset of flank pain in his right side radiating to his inguinal region and leg same side, this started about 7 days ago, he then develop dysuria few days later and yesterday night passed a small stone through his urethra.  He denied fever, chills, frequency, urgency or hematuria.  Patient has had kidney stones in the past, last episode about 5 years ago.  Today he has very mild dysuria, but not other symptoms.  He was worried about potential infection, but I explained him that since his symptoms have improved significantly and he passed the stone there is not recommendations to get his urine tested unless he develops concerning symptoms such as fever, chills, significant dysuria, frequency or urgency.    Review of Systems   Review of Systems   Constitutional:  Negative for chills, fever, malaise/fatigue and weight loss.   HENT:  Negative for congestion, nosebleeds, sinus pain, sore throat and tinnitus.    Eyes:  Negative for blurred vision, double vision and photophobia.   Respiratory:  Negative for cough, hemoptysis, sputum production and shortness of breath.    Cardiovascular:  Negative for chest pain, palpitations, orthopnea and leg swelling.   Gastrointestinal:  Negative for abdominal pain, blood in stool, heartburn, melena, nausea and vomiting.   Genitourinary:  Positive for dysuria and flank pain. Negative for frequency, hematuria and urgency.        Symptoms started 7 days ago, past a stone on 11/15/22 at night, currently mild dysuria.   Musculoskeletal:  Negative for back pain, joint pain, myalgias and neck pain.   Skin:  Negative for rash.   Neurological:  Negative  "for dizziness, tingling, tremors, sensory change, speech change, focal weakness and headaches.   Endo/Heme/Allergies:  Negative for environmental allergies. Does not bruise/bleed easily.   Psychiatric/Behavioral:  Negative for depression, hallucinations, substance abuse and suicidal ideas. The patient is not nervous/anxious.        No past medical history on file.    Patient Active Problem List    Diagnosis Date Noted    Benign prostatic hyperplasia with nocturia 05/20/2022       Allergies:Patient has no known allergies.    Current Outpatient Medications   Medication Sig Dispense Refill    dutasteride (AVODART) 0.5 MG capsule Take 1 Capsule by mouth every day. 30 Capsule 3    tamsulosin (FLOMAX) 0.4 MG capsule Take 1 Capsule by mouth every day. 90 Capsule 3     No current facility-administered medications for this visit.       Social History     Tobacco Use    Smoking status: Never    Smokeless tobacco: Never   Vaping Use    Vaping Use: Never used   Substance Use Topics    Alcohol use: Never    Drug use: Never       No family history on file.      Physical Exam  Vitals:  BP (!) 145/79 (BP Location: Left arm, Patient Position: Sitting, BP Cuff Size: Adult)   Pulse 70   Temp 36.7 °C (98.1 °F) (Skin)   Ht 1.651 m (5' 5\")   Wt 62.6 kg (138 lb)   SpO2 98%  Body mass index is 22.96 kg/m².    Constitutional:  Not in acute distress, well appearing.  HEENT:   NC/AT, PERRLA, NEOM.  Cardiovascular: Regular rate and rhythm. No murmurs or gallops.      Lungs:   Clear to auscultation bilaterally. No wheezes or crackles. No respiratory distress.  Abdomen: Not distended, soft, not tender. No guarding or rigidity. No masses.  Extremities:  No cyanosis/clubbing/edema. No obvious deformities.  Skin:  Warm and dry.  No visible rashes.  Neurologic: Alert & oriented x 3, CN II-XII grossly intact, strength and sensation grossly intact.  No focal deficits noted.  Psychiatric:  Affect normal, mood normal, judgment " normal.      Assessment and Plan:     Kidney stone right  - Sudden pain in his right flank a week ago pain radiated to his growing and reg, recurrent coming in waves, pain was moderate not needed to go to the ED  - Hx of stones in the past few times, most recent episode was 5 years ago.  - Unclear what type of stones he has had  - He also developed dysuria couple days later likely as the stone descended  - He was able to pass stone last night   - Now he has very mild dysuria but not very bothersome.  - Denies urgency, frequency or hematuria, fever or chills.  - He asked me if necessary to test for UTI  - I told patient that since his symptoms are resolving and the only current symptom is mild dysuria there is not need for urine testing.  - I told him that in case he develops worrisome symptoms we could send his urine to get tested but as now is not recommended. Pain should resolve completely within a few days.       Plan:  - Continue taking flomax   - Stay well hydrated  - Avoid carbonated drinks, sugary drinks, meats, high salt foods, avoid vit C supplementation, avoid calcium supplements.  - Call me if symptoms recur  - Otherwise follow up with me in 6 months     BPH with nocturia  - Nocturia persists once or twice per night but not every single night  - Patient states sleeping enough hours at night  - On Avodart and Flomax  - Continue above drugs    Health care maintenance   - Basic labs prior next visit  - No labs in a year    Return in about 6 months (around 5/16/2023).      Please note that this dictation was created using voice recognition software. I have made every reasonable attempt to correct obvious errors, but I expect that there are errors of grammar and possibly content that I did not discover before finalizing the note.    Total face-to-face time spent in medical decision making:    Dr. Pete M.D. PGY-3  [facility]

## 2023-04-27 ENCOUNTER — HOSPITAL ENCOUNTER (OUTPATIENT)
Dept: LAB | Facility: MEDICAL CENTER | Age: 58
End: 2023-04-27
Attending: STUDENT IN AN ORGANIZED HEALTH CARE EDUCATION/TRAINING PROGRAM
Payer: COMMERCIAL

## 2023-04-27 DIAGNOSIS — Z00.00 HEALTHCARE MAINTENANCE: ICD-10-CM

## 2023-04-27 LAB
ALBUMIN SERPL BCP-MCNC: 4.1 G/DL (ref 3.2–4.9)
ALBUMIN/GLOB SERPL: 1.1 G/DL
ALP SERPL-CCNC: 59 U/L (ref 30–99)
ALT SERPL-CCNC: 29 U/L (ref 2–50)
ANION GAP SERPL CALC-SCNC: 9 MMOL/L (ref 7–16)
AST SERPL-CCNC: 27 U/L (ref 12–45)
BASOPHILS # BLD AUTO: 0.3 % (ref 0–1.8)
BASOPHILS # BLD: 0.02 K/UL (ref 0–0.12)
BILIRUB SERPL-MCNC: 0.8 MG/DL (ref 0.1–1.5)
BUN SERPL-MCNC: 14 MG/DL (ref 8–22)
CALCIUM ALBUM COR SERPL-MCNC: 9.2 MG/DL (ref 8.5–10.5)
CALCIUM SERPL-MCNC: 9.3 MG/DL (ref 8.5–10.5)
CHLORIDE SERPL-SCNC: 106 MMOL/L (ref 96–112)
CHOLEST SERPL-MCNC: 187 MG/DL (ref 100–199)
CO2 SERPL-SCNC: 24 MMOL/L (ref 20–33)
CREAT SERPL-MCNC: 0.94 MG/DL (ref 0.5–1.4)
EOSINOPHIL # BLD AUTO: 0.05 K/UL (ref 0–0.51)
EOSINOPHIL NFR BLD: 0.8 % (ref 0–6.9)
ERYTHROCYTE [DISTWIDTH] IN BLOOD BY AUTOMATED COUNT: 44.7 FL (ref 35.9–50)
EST. AVERAGE GLUCOSE BLD GHB EST-MCNC: 111 MG/DL
GFR SERPLBLD CREATININE-BSD FMLA CKD-EPI: 93 ML/MIN/1.73 M 2
GLOBULIN SER CALC-MCNC: 3.6 G/DL (ref 1.9–3.5)
GLUCOSE SERPL-MCNC: 93 MG/DL (ref 65–99)
HBA1C MFR BLD: 5.5 % (ref 4–5.6)
HCT VFR BLD AUTO: 46.4 % (ref 42–52)
HDLC SERPL-MCNC: 57 MG/DL
HGB BLD-MCNC: 15.6 G/DL (ref 14–18)
IMM GRANULOCYTES # BLD AUTO: 0.01 K/UL (ref 0–0.11)
IMM GRANULOCYTES NFR BLD AUTO: 0.2 % (ref 0–0.9)
LDLC SERPL CALC-MCNC: 117 MG/DL
LYMPHOCYTES # BLD AUTO: 2.16 K/UL (ref 1–4.8)
LYMPHOCYTES NFR BLD: 35.9 % (ref 22–41)
MCH RBC QN AUTO: 31.1 PG (ref 27–33)
MCHC RBC AUTO-ENTMCNC: 33.6 G/DL (ref 33.7–35.3)
MCV RBC AUTO: 92.6 FL (ref 81.4–97.8)
MONOCYTES # BLD AUTO: 0.44 K/UL (ref 0–0.85)
MONOCYTES NFR BLD AUTO: 7.3 % (ref 0–13.4)
NEUTROPHILS # BLD AUTO: 3.33 K/UL (ref 1.82–7.42)
NEUTROPHILS NFR BLD: 55.5 % (ref 44–72)
NRBC # BLD AUTO: 0 K/UL
NRBC BLD-RTO: 0 /100 WBC
PLATELET # BLD AUTO: 182 K/UL (ref 164–446)
PMV BLD AUTO: 12.3 FL (ref 9–12.9)
POTASSIUM SERPL-SCNC: 4.5 MMOL/L (ref 3.6–5.5)
PROT SERPL-MCNC: 7.7 G/DL (ref 6–8.2)
RBC # BLD AUTO: 5.01 M/UL (ref 4.7–6.1)
SODIUM SERPL-SCNC: 139 MMOL/L (ref 135–145)
TRIGL SERPL-MCNC: 67 MG/DL (ref 0–149)
WBC # BLD AUTO: 6 K/UL (ref 4.8–10.8)

## 2023-04-27 PROCEDURE — 80061 LIPID PANEL: CPT

## 2023-04-27 PROCEDURE — 85025 COMPLETE CBC W/AUTO DIFF WBC: CPT

## 2023-04-27 PROCEDURE — 80053 COMPREHEN METABOLIC PANEL: CPT

## 2023-04-27 PROCEDURE — 36415 COLL VENOUS BLD VENIPUNCTURE: CPT

## 2023-04-27 PROCEDURE — 83036 HEMOGLOBIN GLYCOSYLATED A1C: CPT

## 2023-05-01 ENCOUNTER — TELEPHONE (OUTPATIENT)
Dept: INTERNAL MEDICINE | Facility: OTHER | Age: 58
End: 2023-05-01

## 2023-05-01 ENCOUNTER — OFFICE VISIT (OUTPATIENT)
Dept: INTERNAL MEDICINE | Facility: OTHER | Age: 58
End: 2023-05-01
Payer: COMMERCIAL

## 2023-05-01 VITALS
OXYGEN SATURATION: 97 % | HEART RATE: 80 BPM | BODY MASS INDEX: 22.99 KG/M2 | TEMPERATURE: 97 F | WEIGHT: 138 LBS | DIASTOLIC BLOOD PRESSURE: 87 MMHG | HEIGHT: 65 IN | SYSTOLIC BLOOD PRESSURE: 135 MMHG

## 2023-05-01 DIAGNOSIS — N40.1 BENIGN PROSTATIC HYPERPLASIA WITH NOCTURIA: ICD-10-CM

## 2023-05-01 DIAGNOSIS — R35.1 BPH ASSOCIATED WITH NOCTURIA: ICD-10-CM

## 2023-05-01 DIAGNOSIS — E78.5 DYSLIPIDEMIA: ICD-10-CM

## 2023-05-01 DIAGNOSIS — N40.1 BPH ASSOCIATED WITH NOCTURIA: ICD-10-CM

## 2023-05-01 DIAGNOSIS — R35.1 BENIGN PROSTATIC HYPERPLASIA WITH NOCTURIA: ICD-10-CM

## 2023-05-01 PROBLEM — N20.0 KIDNEY STONES: Status: ACTIVE | Noted: 2023-05-01

## 2023-05-01 PROCEDURE — 99214 OFFICE O/P EST MOD 30 MIN: CPT | Mod: GC | Performed by: STUDENT IN AN ORGANIZED HEALTH CARE EDUCATION/TRAINING PROGRAM

## 2023-05-01 RX ORDER — ATORVASTATIN CALCIUM 10 MG/1
10 TABLET, FILM COATED ORAL NIGHTLY
Qty: 60 TABLET | Refills: 3 | Status: SHIPPED | OUTPATIENT
Start: 2023-05-01

## 2023-05-01 RX ORDER — DUTASTERIDE 0.5 MG/1
0.5 CAPSULE, LIQUID FILLED ORAL DAILY
Qty: 30 CAPSULE | Refills: 3 | Status: SHIPPED | OUTPATIENT
Start: 2023-05-01 | End: 2023-10-16 | Stop reason: SDUPTHER

## 2023-05-01 RX ORDER — TAMSULOSIN HYDROCHLORIDE 0.4 MG/1
0.4 CAPSULE ORAL DAILY
Qty: 90 CAPSULE | Refills: 3 | Status: SHIPPED | OUTPATIENT
Start: 2023-05-01 | End: 2023-09-14

## 2023-05-01 ASSESSMENT — FIBROSIS 4 INDEX: FIB4 SCORE: 1.65

## 2023-05-01 ASSESSMENT — PATIENT HEALTH QUESTIONNAIRE - PHQ9: CLINICAL INTERPRETATION OF PHQ2 SCORE: 0

## 2023-05-01 NOTE — PROGRESS NOTES
Established Patient    Patient Care Team:  Jama Freeman M.D. as PCP - General (Internal Medicine)    HPI:  Hero Jamil is a 58 y.o. male who presents today with the following Chief Complaint(s):   Chief Complaint   Patient presents with    Lab Results     Mr. Osborne is here for follow up with lab work.  CBC and CMP within normal limits. A1c: 5.5%.  Lipid profile elevated LDL:117.  Calculated ASCVD score: 6.8%. 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 Denies any stiffness in the mornings, swelling or redness. Recommended topical voltaren.    Review of Systems   Constitutional: Denies chills, fever, fatigue/malaise, weight changes  HEENT: Denies blurry vision, congestion, sore throat  Respiratory: Denies shortness of breath, cough, wheezing   Cardiovascular: Denies chest pain, palpitations, leg swelling  Gastrointestinal: Denies heartburn, nausea, vomiting, abdominal pain, constipation, diarrhea  Genitourinary: Denies dysuria, frequency  Musculoskeletal: Denies falls and myalgias.   Skin: Denies itching and rash.   Neurological: Negative for headaches, dizziness, loss of consciousness      No past medical history on file.    Patient Active Problem List    Diagnosis Date Noted    Kidney stones 05/01/2023    Dyslipidemia 05/01/2023    Benign prostatic hyperplasia with nocturia 05/20/2022       Allergies:Patient has no known allergies.    Current Outpatient Medications   Medication Sig Dispense Refill    atorvastatin (LIPITOR) 10 MG Tab Take 1 Tablet by mouth every evening. 60 Tablet 3    dutasteride (AVODART) 0.5 MG capsule Take 1 Capsule by mouth every day. 30 Capsule 3    tamsulosin (FLOMAX) 0.4 MG capsule Take 1 Capsule by mouth every day. 90 Capsule 3     No current  "facility-administered medications for this visit.       Social History     Tobacco Use    Smoking status: Never    Smokeless tobacco: Never   Vaping Use    Vaping Use: Never used   Substance Use Topics    Alcohol use: Never    Drug use: Never       No family history on file.      Physical Exam  Vitals:  /87 (BP Location: Right arm, Patient Position: Sitting, BP Cuff Size: Adult)   Pulse 80   Temp 36.1 °C (97 °F) (Temporal)   Ht 1.651 m (5' 5\")   Wt 62.6 kg (138 lb)   SpO2 97%  Body mass index is 22.96 kg/m².    Constitutional:  Not in acute distress, well appearing.  HEENT:   NC/AT, EOMI, conjunctiva normal, hearing grossly intact  Cardiovascular: Regular rate and rhythm. No murmurs or gallops.      Lungs:   Clear to auscultation bilaterally. No wheezes or crackles. No respiratory distress.  Abdomen: Not distended, soft, not tender. No guarding or rigidity. No masses.  Extremities:  No cyanosis/clubbing/edema. No obvious deformities.  Skin:  Warm and dry.  No visible rashes.  Neurologic: Alert & oriented x 3, CN II-XII grossly intact, strength and sensation grossly intact.  No focal deficits noted.  Psychiatric:  Affect normal, mood normal, judgment normal.      Assessment and Plan:     Dyslipidemia  - LDL:117  - No known CAD  - Non smoker  - ASCVD score 6.8% (recommended intermediate statin)  - Patient ok with statin therapy   - Explained potential side effects (low risk)  - Start Lipitor 10 mg daily  - Regular exercise, healthy diet  - Follow up in 6 months  - Lipid profile prior next visit    BPH  - Doing well, nocturia, once at night not every day  - Taking Flomax and Avodart  - No side effects  - CTM      Topical voltaren if wrists and shoulder worsen     Problem List Items Addressed This Visit       Benign prostatic hyperplasia with nocturia    Dyslipidemia    Relevant Medications    atorvastatin (LIPITOR) 10 MG Tab     Other Visit Diagnoses       BPH associated with nocturia        Relevant " Medications    dutasteride (AVODART) 0.5 MG capsule    tamsulosin (FLOMAX) 0.4 MG capsule            Return in about 6 months (around 11/1/2023).    Please note that this dictation was created using voice recognition software. I have made every reasonable attempt to correct obvious errors, but I expect that there are errors of grammar and possibly content that I did not discover before finalizing the note.    Dr. Pete M.D. PGY-3  [facility]

## 2023-05-01 NOTE — TELEPHONE ENCOUNTER
At time of check out there we no labs in the chart. Patient would like labs mailed to them as soon as they are ordered

## 2023-05-01 NOTE — PATIENT INSTRUCTIONS
- Continue home meds  - Start Lipitor 10 mg 1 tab daily  - Regular exercise, low fat diet  - Labs in 6 months  - Follow up in 6 months

## 2023-09-14 ENCOUNTER — HOSPITAL ENCOUNTER (OUTPATIENT)
Facility: MEDICAL CENTER | Age: 58
End: 2023-09-14
Attending: INTERNAL MEDICINE
Payer: COMMERCIAL

## 2023-09-14 ENCOUNTER — OFFICE VISIT (OUTPATIENT)
Dept: INTERNAL MEDICINE | Facility: OTHER | Age: 58
End: 2023-09-14
Payer: COMMERCIAL

## 2023-09-14 VITALS
OXYGEN SATURATION: 97 % | SYSTOLIC BLOOD PRESSURE: 128 MMHG | TEMPERATURE: 97.4 F | WEIGHT: 134.4 LBS | HEART RATE: 76 BPM | DIASTOLIC BLOOD PRESSURE: 74 MMHG | BODY MASS INDEX: 22.39 KG/M2 | HEIGHT: 65 IN

## 2023-09-14 DIAGNOSIS — R30.0 DYSURIA: ICD-10-CM

## 2023-09-14 DIAGNOSIS — R35.1 BPH ASSOCIATED WITH NOCTURIA: ICD-10-CM

## 2023-09-14 DIAGNOSIS — N20.0 KIDNEY STONES: ICD-10-CM

## 2023-09-14 DIAGNOSIS — R33.9 URINARY RETENTION: ICD-10-CM

## 2023-09-14 DIAGNOSIS — E78.5 DYSLIPIDEMIA: ICD-10-CM

## 2023-09-14 DIAGNOSIS — R35.1 BENIGN PROSTATIC HYPERPLASIA WITH NOCTURIA: ICD-10-CM

## 2023-09-14 DIAGNOSIS — N40.1 BPH ASSOCIATED WITH NOCTURIA: ICD-10-CM

## 2023-09-14 DIAGNOSIS — N40.1 BENIGN PROSTATIC HYPERPLASIA WITH NOCTURIA: ICD-10-CM

## 2023-09-14 LAB
APPEARANCE UR: CLEAR
BILIRUB UR STRIP-MCNC: NORMAL MG/DL
COLOR UR AUTO: YELLOW
GLUCOSE UR STRIP.AUTO-MCNC: NORMAL MG/DL
KETONES UR STRIP.AUTO-MCNC: NORMAL MG/DL
LEUKOCYTE ESTERASE UR QL STRIP.AUTO: NORMAL
NITRITE UR QL STRIP.AUTO: NORMAL
PH UR STRIP.AUTO: 7 [PH] (ref 5–8)
PROT UR QL STRIP: NORMAL MG/DL
RBC UR QL AUTO: NORMAL
SP GR UR STRIP.AUTO: 1.02
UROBILINOGEN UR STRIP-MCNC: 0.2 MG/DL

## 2023-09-14 PROCEDURE — 81002 URINALYSIS NONAUTO W/O SCOPE: CPT | Performed by: INTERNAL MEDICINE

## 2023-09-14 PROCEDURE — 87086 URINE CULTURE/COLONY COUNT: CPT

## 2023-09-14 PROCEDURE — 3078F DIAST BP <80 MM HG: CPT | Performed by: INTERNAL MEDICINE

## 2023-09-14 PROCEDURE — 99214 OFFICE O/P EST MOD 30 MIN: CPT | Performed by: INTERNAL MEDICINE

## 2023-09-14 PROCEDURE — 3074F SYST BP LT 130 MM HG: CPT | Performed by: INTERNAL MEDICINE

## 2023-09-14 RX ORDER — CIPROFLOXACIN 500 MG/1
500 TABLET, FILM COATED ORAL 2 TIMES DAILY
Qty: 14 TABLET | Refills: 0 | Status: SHIPPED | OUTPATIENT
Start: 2023-09-14 | End: 2023-11-15

## 2023-09-14 RX ORDER — TAMSULOSIN HYDROCHLORIDE 0.4 MG/1
0.8 CAPSULE ORAL DAILY
Qty: 90 CAPSULE | Refills: 3 | Status: SHIPPED | OUTPATIENT
Start: 2023-09-14

## 2023-09-14 ASSESSMENT — FIBROSIS 4 INDEX: FIB4 SCORE: 1.65

## 2023-09-14 NOTE — PROGRESS NOTES
9/14/2023  Chief Complaint   Patient presents with    Urinary Retention     Patient here for urinary retention       HISTORY OF PRESENT ILLNESS: Patient is a 58 y.o. male established patient who presents for urinary symptoms. Patient reports a one week history 1 week history dysuria, middle back pain, straining and incomplete emptying. Reports his bladder feels full.  Denies fever, chills, nausea or vomiting or urinary urgency. He does have a history of kidney stone, states pain is different from that episode. Less severe pain. He continues to take Flomax and Avodart. He has been on it for many years without side effects.     History reviewed. No pertinent past medical history.    Patient Active Problem List    Diagnosis Date Noted    Kidney stones 05/01/2023    Dyslipidemia 05/01/2023    Benign prostatic hyperplasia with nocturia 05/20/2022       Allergies:Patient has no known allergies.    Current Outpatient Medications   Medication Sig Dispense Refill    atorvastatin (LIPITOR) 10 MG Tab Take 1 Tablet by mouth every evening. 60 Tablet 3    dutasteride (AVODART) 0.5 MG capsule Take 1 Capsule by mouth every day. 30 Capsule 3    tamsulosin (FLOMAX) 0.4 MG capsule Take 1 Capsule by mouth every day. 90 Capsule 3     No current facility-administered medications for this visit.       Social History     Tobacco Use    Smoking status: Never    Smokeless tobacco: Never   Vaping Use    Vaping Use: Never used   Substance Use Topics    Alcohol use: Never    Drug use: Never       History reviewed. No pertinent family history.      Review of Systems:   Constitutional: Negative for chills and fever.   HENT: Negative for ear and eye pain.     Eyes: Negative for discharge and redness.   Respiratory: Negative for hemoptysis  Cardiovascular: Negative for chest pain, palpitations and leg swelling.   Gastrointestinal: Negative for abdominal pain, nausea and vomiting.   Musculoskeletal: Negative for myalgias  Skin: Negative for itching  "and rash.   Neurological: Negative for dizziness, and headaches.   Endo/Heme/Allergies: Does not bruise/bleed easily.   All systems reviewed and are negative except as mention in HPI    Exam:  /74 (BP Location: Left arm, Patient Position: Sitting, BP Cuff Size: Adult)   Pulse 76   Temp 36.3 °C (97.4 °F) (Temporal)   Ht 1.651 m (5' 5\")   Wt 61 kg (134 lb 6.4 oz)   SpO2 97%  Body mass index is 22.37 kg/m².  Constitutional:  NAD, well appearing.  HEENT:   Normcephalic, atraumatic, no eye redness, pupils equal  Cardiovascular: RRR.   No murmurs, rubs, gallops. No carotid bruits.       Lungs:   CTAB, no wheezes, rales or rhonchi, no respiratory distress.  Abdomen: Soft, non-tender, non-distended. + Suprapubic tenderness and mild CVA tenderness.   Extremities:  2+ DP and radial pulses bilaterally.  No clubbing, cyanosis or edema  Skin:  Warm and dry.    Neurologic: Alert & oriented x 3, CN II-XII grossly intact, strength and sensation grossly intact.  No focal deficits noted.  Psychiatric:  Affect normal, mood normal, judgment normal.    Assessment/Plan:   Urinary retention  Dysuria  Benign prostatic hyperplasia with nocturia  Bladder scan- prevoid 1218ml/ postvoid 1050.  We called to have to patient seen by [facility] at 3pm today for catheterization.  Will increase Flomax to 0.8mg daily, continue Avodart 0.5mg daily.  Although UA without evidence of infection, will treat for UTI in the setting of retention and dysuria.  - POCT BLADDER SCAN  - POCT Urinalysis  - Amb ref Urology    Dyslipidemia  Continue Lipitor  Repeat lipid panel 4/2024    Kidney stones  Patient with history of kidney stone   Last episode 11/2022.  Continue to stay adequately hydrate, on Flomax for BPH  ER precautions given for recurrence of symptoms.    Follow-up in 2 weeks.     All imaging results and lab results and consult notes are reviewed at this visit.  Followup: 2 weeks    My total time spent caring for the patient on the day of " the encounter was 35 minutes.   This does not include time spent on separately billable procedures/tests.

## 2023-09-17 LAB
BACTERIA UR CULT: NORMAL
SIGNIFICANT IND 70042: NORMAL
SITE SITE: NORMAL
SOURCE SOURCE: NORMAL

## 2023-09-27 ENCOUNTER — OFFICE VISIT (OUTPATIENT)
Dept: INTERNAL MEDICINE | Facility: OTHER | Age: 58
End: 2023-09-27
Payer: COMMERCIAL

## 2023-09-27 VITALS
SYSTOLIC BLOOD PRESSURE: 136 MMHG | HEIGHT: 67 IN | WEIGHT: 138 LBS | BODY MASS INDEX: 21.66 KG/M2 | OXYGEN SATURATION: 96 % | TEMPERATURE: 97.3 F | DIASTOLIC BLOOD PRESSURE: 85 MMHG | HEART RATE: 69 BPM

## 2023-09-27 DIAGNOSIS — K64.9 HEMORRHOIDS, UNSPECIFIED HEMORRHOID TYPE: ICD-10-CM

## 2023-09-27 DIAGNOSIS — R35.1 BENIGN PROSTATIC HYPERPLASIA WITH NOCTURIA: ICD-10-CM

## 2023-09-27 DIAGNOSIS — R33.9 URINARY RETENTION: ICD-10-CM

## 2023-09-27 DIAGNOSIS — N40.1 BENIGN PROSTATIC HYPERPLASIA WITH NOCTURIA: ICD-10-CM

## 2023-09-27 PROCEDURE — 3075F SYST BP GE 130 - 139MM HG: CPT | Performed by: INTERNAL MEDICINE

## 2023-09-27 PROCEDURE — 3079F DIAST BP 80-89 MM HG: CPT | Performed by: INTERNAL MEDICINE

## 2023-09-27 PROCEDURE — 99213 OFFICE O/P EST LOW 20 MIN: CPT | Performed by: INTERNAL MEDICINE

## 2023-09-27 RX ORDER — HYDROCORTISONE 25 MG/G
CREAM TOPICAL
Qty: 28 G | Refills: 1 | Status: SHIPPED | OUTPATIENT
Start: 2023-09-27 | End: 2023-11-15

## 2023-09-27 ASSESSMENT — FIBROSIS 4 INDEX: FIB4 SCORE: 1.65

## 2023-09-27 NOTE — PROGRESS NOTES
9/27/2023  Chief Complaint   Patient presents with    Follow-Up     No improvements from last visit or worsening symptoms       HISTORY OF PRESENT ILLNESS: Patient is a 58 y.o. male established patient who presents today for the following.      Patient continues to c/o  urinary retention, he is able to urinate but feels that he continues to have incomplete emptying. He was seen by Urology after his last visit, however, reports that on repeat bladder scanning his bladder was empty. He did not require catheterization. He generally urinates a little bit in the morning but is able to void more in the afternoon. He continues to take Avodart 0.5mg daily and Flomax 0.8mg. Suprapubic and flank pain has improved. Denies any fevers, chills, nausea, vomiting. Has appt with Urology on Monday.    Today, patient also c/o hemorrhoids. They have been present for over 20 years. He denies any bleeding. Reports that they are bothersome and causes some itchiness. Has some constipation.      Patient Active Problem List    Diagnosis Date Noted    Kidney stones 05/01/2023    Dyslipidemia 05/01/2023    Benign prostatic hyperplasia with nocturia 05/20/2022       Allergies:Patient has no known allergies.    Current Outpatient Medications   Medication Sig Dispense Refill    hydrocortisone rectal (PERIANAL) 2.5% Cream Apply to rectum every 12 hours. 28 g 1    tamsulosin (FLOMAX) 0.4 MG capsule Take 2 Capsules by mouth every day. 90 Capsule 3    ciprofloxacin (CIPRO) 500 MG Tab Take 1 Tablet by mouth 2 times a day. 14 Tablet 0    atorvastatin (LIPITOR) 10 MG Tab Take 1 Tablet by mouth every evening. 60 Tablet 3    dutasteride (AVODART) 0.5 MG capsule Take 1 Capsule by mouth every day. 30 Capsule 3     No current facility-administered medications for this visit.       Social History     Tobacco Use    Smoking status: Never    Smokeless tobacco: Never   Vaping Use    Vaping Use: Never used   Substance Use Topics    Alcohol use: Never    Drug use:  "Never       No family history on file.      Review of Systems:   Constitutional: Negative for chills and fever.   Cardiovascular: Negative for chest pain, palpitations and leg swelling.   Gastrointestinal: Negative for abdominal pain, nausea and vomiting.   Skin: Negative for itching and rash.   All systems reviewed and are negative except as mention in HPI    Exam:  /85 (BP Location: Left arm, Patient Position: Sitting, BP Cuff Size: Adult)   Pulse 69   Temp 36.3 °C (97.3 °F) (Temporal)   Ht 1.695 m (5' 6.73\")   Wt 62.6 kg (138 lb)   SpO2 96%  Body mass index is 21.79 kg/m².  Constitutional:  NAD, well appearing.  HEENT:   Normcephalic, atraumatic, no eye redness, pupils equal  Cardiovascular: RRR.   No murmurs, rubs, gallops. No carotid bruits.       Lungs:   CTAB, no wheezes, rales or rhonchi, no respiratory distress.  Abdomen: Soft, non-tender, non-distended.  Rectal: Small external hemorrhoids presents, no bleeding or thrombosis noted.   Skin:  Warm and dry.    Neurologic: Alert & oriented x 3.No focal deficits noted.  Psychiatric:  Affect normal, mood normal, judgment normal.    Assessment/Plan:     Benign prostatic hyperplasia with nocturia  Urinary retention  Patient is able to void, mostly in the afternoon. Suprapubic and flank pain has improved.  Continue Avodart and Flomax.   Will check PSA.  Patient to see [facility] 10/2  - Prostate Specific Ag Screening; Future      Hemorrhoids, unspecified hemorrhoid type  External hemorrhoids, no thrombosis or bleeding.  Discuss high fiber diet and adequate hydrate, avoid straining and sitting on the toilet for extended periods of time.  - hydrocortisone rectal (PERIANAL) 2.5% Cream; Apply to rectum every 12 hours.  Dispense: 28 g; Refill: 1    All imaging results and lab results and consult notes are reviewed at this visit.  Followup: Return in about 8 weeks (around 11/22/2023).    My total time spent caring for the patient on the day of the encounter " was 25 minutes.   This does not include time spent on separately billable procedures/tests.

## 2023-09-28 ENCOUNTER — HOSPITAL ENCOUNTER (OUTPATIENT)
Dept: LAB | Facility: MEDICAL CENTER | Age: 58
End: 2023-09-28
Attending: INTERNAL MEDICINE
Payer: COMMERCIAL

## 2023-09-28 DIAGNOSIS — R35.1 BENIGN PROSTATIC HYPERPLASIA WITH NOCTURIA: ICD-10-CM

## 2023-09-28 DIAGNOSIS — N40.1 BENIGN PROSTATIC HYPERPLASIA WITH NOCTURIA: ICD-10-CM

## 2023-09-28 LAB — PSA SERPL-MCNC: 0.25 NG/ML (ref 0–4)

## 2023-09-28 PROCEDURE — 36415 COLL VENOUS BLD VENIPUNCTURE: CPT

## 2023-09-28 PROCEDURE — 84153 ASSAY OF PSA TOTAL: CPT

## 2023-10-16 DIAGNOSIS — R35.1 BPH ASSOCIATED WITH NOCTURIA: ICD-10-CM

## 2023-10-16 DIAGNOSIS — N40.1 BPH ASSOCIATED WITH NOCTURIA: ICD-10-CM

## 2023-10-16 RX ORDER — DUTASTERIDE 0.5 MG/1
0.5 CAPSULE, LIQUID FILLED ORAL DAILY
Qty: 30 CAPSULE | Refills: 3 | Status: SHIPPED | OUTPATIENT
Start: 2023-10-16 | End: 2023-12-05

## 2023-11-15 ENCOUNTER — OFFICE VISIT (OUTPATIENT)
Dept: INTERNAL MEDICINE | Facility: OTHER | Age: 58
End: 2023-11-15
Payer: COMMERCIAL

## 2023-11-15 VITALS
SYSTOLIC BLOOD PRESSURE: 131 MMHG | TEMPERATURE: 96.9 F | BODY MASS INDEX: 21.72 KG/M2 | HEIGHT: 67 IN | OXYGEN SATURATION: 97 % | WEIGHT: 138.4 LBS | HEART RATE: 66 BPM | DIASTOLIC BLOOD PRESSURE: 80 MMHG

## 2023-11-15 DIAGNOSIS — N20.0 KIDNEY STONES: ICD-10-CM

## 2023-11-15 DIAGNOSIS — K64.9 HEMORRHOIDS, UNSPECIFIED HEMORRHOID TYPE: ICD-10-CM

## 2023-11-15 DIAGNOSIS — R35.1 BENIGN PROSTATIC HYPERPLASIA WITH NOCTURIA: ICD-10-CM

## 2023-11-15 DIAGNOSIS — Z11.59 NEED FOR HEPATITIS C SCREENING TEST: ICD-10-CM

## 2023-11-15 DIAGNOSIS — N40.1 BENIGN PROSTATIC HYPERPLASIA WITH NOCTURIA: ICD-10-CM

## 2023-11-15 DIAGNOSIS — E78.5 DYSLIPIDEMIA: ICD-10-CM

## 2023-11-15 DIAGNOSIS — Z23 NEED FOR VACCINATION: ICD-10-CM

## 2023-11-15 DIAGNOSIS — Z11.4 SCREENING FOR HIV (HUMAN IMMUNODEFICIENCY VIRUS): ICD-10-CM

## 2023-11-15 DIAGNOSIS — Z12.11 ENCOUNTER FOR SCREENING COLONOSCOPY: ICD-10-CM

## 2023-11-15 PROCEDURE — 90686 IIV4 VACC NO PRSV 0.5 ML IM: CPT | Performed by: INTERNAL MEDICINE

## 2023-11-15 PROCEDURE — 90471 IMMUNIZATION ADMIN: CPT | Performed by: INTERNAL MEDICINE

## 2023-11-15 PROCEDURE — 99214 OFFICE O/P EST MOD 30 MIN: CPT | Mod: 25 | Performed by: INTERNAL MEDICINE

## 2023-11-15 PROCEDURE — 90472 IMMUNIZATION ADMIN EACH ADD: CPT | Performed by: INTERNAL MEDICINE

## 2023-11-15 PROCEDURE — 3075F SYST BP GE 130 - 139MM HG: CPT | Performed by: INTERNAL MEDICINE

## 2023-11-15 PROCEDURE — 3079F DIAST BP 80-89 MM HG: CPT | Performed by: INTERNAL MEDICINE

## 2023-11-15 PROCEDURE — 90715 TDAP VACCINE 7 YRS/> IM: CPT | Performed by: INTERNAL MEDICINE

## 2023-11-15 RX ORDER — HYDROCORTISONE ACETATE 25 MG/1
25 SUPPOSITORY RECTAL EVERY 12 HOURS
Qty: 24 SUPPOSITORY | Refills: 3 | Status: SHIPPED | OUTPATIENT
Start: 2023-11-15 | End: 2024-03-20

## 2023-11-15 ASSESSMENT — FIBROSIS 4 INDEX: FIB4 SCORE: 1.65

## 2023-11-15 NOTE — PATIENT INSTRUCTIONS
Drink plenty of water and eat foods high in fiber to help avoid constipation.   Avoid sitting on the toilet for too long  Recommend Sitz bath and OTC Preparation H   May use steroid cream for worsening of symptoms.

## 2023-11-15 NOTE — PROGRESS NOTES
11/15/2023  Chief Complaint   Patient presents with    Follow-Up       HISTORY OF PRESENT ILLNESS: Patient is a 58 y.o. male established patient who presents today for the following.      1. Benign prostatic hyperplasia with nocturia  He reports that his symptoms are stable. He denies any suprapubic pain. He continues to have some frequency, but denies any retention or incomplete emptying. He continues to take Avodart and Flomax. Was een by Urology on 10/2/2023, plans to have yearly follow-up.    2. Kidney stones  Has history of kidney stone, last episode was 11/2022. Recent renal ultrasound with bilateral stones RT 0.91cm and LT 0.5, no hydronephrosis. Urology plans for repeat imaging in 1 years.    3. Hemorrhoids, unspecified hemorrhoid type  He reports that he continues to have some occasional pain and itching. Hydrocortisone cream did not work as well as prior suppositories. He denies any bleeding. He does make sure he drink plenty of fluids to avoid constipation. Last colonoscopy 9-10 years ago.    No past medical history on file.    Patient Active Problem List    Diagnosis Date Noted    Kidney stones 05/01/2023    Dyslipidemia 05/01/2023    Benign prostatic hyperplasia with nocturia 05/20/2022       Allergies:Patient has no known allergies.    Current Outpatient Medications   Medication Sig Dispense Refill    hydrocortisone (ANUSOL-HC) 25 MG Suppos Insert 1 Suppository into the rectum every 12 hours. 24 Suppository 3    dutasteride (AVODART) 0.5 MG capsule Take 1 Capsule by mouth every day. 30 Capsule 3    tamsulosin (FLOMAX) 0.4 MG capsule Take 2 Capsules by mouth every day. 90 Capsule 3    atorvastatin (LIPITOR) 10 MG Tab Take 1 Tablet by mouth every evening. 60 Tablet 3     No current facility-administered medications for this visit.       Social History     Tobacco Use    Smoking status: Never    Smokeless tobacco: Never   Vaping Use    Vaping Use: Never used   Substance Use Topics    Alcohol use: Never     "Drug use: Never       No family history on file.      Review of Systems:     All systems reviewed and are negative except as mention in HPI    Exam:  /80 (BP Location: Left arm, Patient Position: Sitting, BP Cuff Size: Adult)   Pulse 66   Temp 36.1 °C (96.9 °F) (Temporal)   Ht 1.695 m (5' 6.73\")   Wt 62.8 kg (138 lb 6.4 oz)   SpO2 97%  Body mass index is 21.85 kg/m².  Constitutional:  NAD, well appearing.  HEENT:   Normcephalic, atraumatic, no eye redness, pupils equal  Cardiovascular: RRR.   No murmurs, rubs, gallops. No carotid bruits.       Lungs:   CTAB, no wheezes, rales or rhonchi, no respiratory distress.  Abdomen: Soft, non-tender, non-distended + BS, no masses, no suprapubic tenderness, no hepatomegaly.  Extremities: No clubbing, cyanosis or edema  Skin:  Warm and dry.    Neurologic: Alert & oriented x 3. No focal deficits noted.  Psychiatric:  Affect normal, mood normal, judgment normal.    Assessment/Plan:     1. Benign prostatic hyperplasia with nocturia  Stable, continue Avodart Flomax    2. Kidney stones  Prior history  Last episode 11/2022  Per Urology, bilateral stones RT 0.91cm and LT 0.5, no hydronephrosis - repeat imaging in 1 year  Encourage adequate fluid intake, continue Flomax  - Comp Metabolic Panel; Future    3. Hemorrhoids, unspecified hemorrhoid type  Continues to have some itching, denies any bleeding.  Suppositories were more effective then cream, advise patient to use only to use for 1 week.   Discuss high fiber diet and adequate hydration, avoid straining and sitting, Sitz bath and  Preparation H  - hydrocortisone (ANUSOL-HC) 25 MG Suppos; Insert 1 Suppository into the rectum every 12 hours.  Dispense: 24 Suppository; Refill: 3    4. Dyslipidemia  - Lipid Profile; Future  - HEMOGLOBIN A1C; Future    5. Screening for HIV (human immunodeficiency virus)  - HIV AG/AB COMBO ASSAY SCREENING; Future    6. Need for hepatitis C screening test  - HEP C VIRUS ANTIBODY; Future    7. " Encounter for screening colonoscopy  - Referral to GI for Colonoscopy    8. Need for vaccination  - Tdap Vaccine =>8YO IM  - INFLUENZA VACCINE QUAD INJ (PF)      All imaging results and lab results and consult notes are reviewed at this visit.  Followup: Return in about 6 months (around 5/15/2024).

## 2023-12-01 ENCOUNTER — OFFICE VISIT (OUTPATIENT)
Dept: INTERNAL MEDICINE | Facility: OTHER | Age: 58
End: 2023-12-01
Payer: COMMERCIAL

## 2023-12-01 VITALS
WEIGHT: 138 LBS | HEART RATE: 83 BPM | DIASTOLIC BLOOD PRESSURE: 87 MMHG | SYSTOLIC BLOOD PRESSURE: 128 MMHG | HEIGHT: 66 IN | TEMPERATURE: 97 F | BODY MASS INDEX: 22.18 KG/M2 | OXYGEN SATURATION: 96 %

## 2023-12-01 DIAGNOSIS — N40.1 BPH ASSOCIATED WITH NOCTURIA: ICD-10-CM

## 2023-12-01 DIAGNOSIS — S33.5XXA LUMBAR SPRAIN, INITIAL ENCOUNTER: ICD-10-CM

## 2023-12-01 DIAGNOSIS — S13.9XXA NECK SPRAIN, INITIAL ENCOUNTER: ICD-10-CM

## 2023-12-01 DIAGNOSIS — R35.1 BPH ASSOCIATED WITH NOCTURIA: ICD-10-CM

## 2023-12-01 DIAGNOSIS — M54.50 ACUTE BILATERAL LOW BACK PAIN, UNSPECIFIED WHETHER SCIATICA PRESENT: ICD-10-CM

## 2023-12-01 DIAGNOSIS — S23.9XXA THORACIC BACK SPRAIN, INITIAL ENCOUNTER: ICD-10-CM

## 2023-12-01 PROCEDURE — 3074F SYST BP LT 130 MM HG: CPT | Performed by: INTERNAL MEDICINE

## 2023-12-01 PROCEDURE — 99214 OFFICE O/P EST MOD 30 MIN: CPT | Mod: GC | Performed by: INTERNAL MEDICINE

## 2023-12-01 PROCEDURE — 3079F DIAST BP 80-89 MM HG: CPT | Performed by: INTERNAL MEDICINE

## 2023-12-01 RX ORDER — CYCLOBENZAPRINE HCL 10 MG
10 TABLET ORAL 3 TIMES DAILY PRN
Qty: 60 TABLET | Refills: 0 | Status: SHIPPED | OUTPATIENT
Start: 2023-12-01

## 2023-12-01 ASSESSMENT — ENCOUNTER SYMPTOMS
FALLS: 0
PHOTOPHOBIA: 0
HEMOPTYSIS: 0
NERVOUS/ANXIOUS: 0
DIZZINESS: 0
HEADACHES: 0
DOUBLE VISION: 0
CHILLS: 0
MYALGIAS: 0
FEVER: 0
COUGH: 0
BLURRED VISION: 0
NAUSEA: 0
PALPITATIONS: 0
BACK PAIN: 1
HEARTBURN: 0

## 2023-12-01 ASSESSMENT — FIBROSIS 4 INDEX: FIB4 SCORE: 1.65

## 2023-12-01 NOTE — PROGRESS NOTES
CC:   Chief Complaint   Patient presents with    Back Pain     X 1 week         HPI:   Hero presents today with PMHx of BPH (on tamsulosin 0.4 mg), kidney stones, DLD, hemorrhoids, here today for an acute visit for      Back strain   Describes has had back pain off and on for a year. However has been worse for the last 7-10 days. Works as a  in a school.   Starts at the lower neck and goes all the way to the lower back.   Pain is at the spine with no radiation to the sides   Back feels stiff   Denies radiation to the legs   Denies numbness or tingling   Occasional mild numbness at the left leg, lasts a few mins only- this only happens when he lifts something heavy   No anesthesia in the pelvic region, no unintentional loss of bladder or bowels   Max intensity in 5/10   Has tried ibuprofen, 2 pills of 200 mg once a day that helps   Denies recent trauma, however does describe his job is stressful and he has to mop a lot, furthermore describes his work load has got worse recently/   Ongoing for the last 1 week- 10 days         He was last seen at the clinic about a week ago, healthcare maintenance labs were ordered which are still pending.       No problems updated.    Health Maintenance: Completed    ROS:  Review of Systems   Constitutional:  Negative for chills and fever.   HENT:  Negative for ear pain, hearing loss and tinnitus.    Eyes:  Negative for blurred vision, double vision and photophobia.   Respiratory:  Negative for cough and hemoptysis.    Cardiovascular:  Negative for chest pain and palpitations.   Gastrointestinal:  Negative for heartburn and nausea.   Genitourinary:  Negative for dysuria and urgency.   Musculoskeletal:  Positive for back pain. Negative for falls, joint pain and myalgias.   Skin:  Negative for itching and rash.   Neurological:  Negative for dizziness and headaches.   Psychiatric/Behavioral:  The patient is not nervous/anxious.        Objective:     Exam:  /87 (BP  "Location: Right arm, Patient Position: Sitting, BP Cuff Size: Large adult)   Pulse 83   Temp 36.1 °C (97 °F) (Temporal)   Ht 1.676 m (5' 6\")   Wt 62.6 kg (138 lb)   SpO2 96%   BMI 22.27 kg/m²  Body mass index is 22.27 kg/m².    Physical Exam  Constitutional:       General: He is not in acute distress.     Appearance: Normal appearance. He is not ill-appearing.   HENT:      Head: Normocephalic and atraumatic.      Right Ear: External ear normal.      Left Ear: External ear normal.      Nose: Nose normal. No congestion.      Mouth/Throat:      Mouth: Mucous membranes are moist.   Eyes:      Extraocular Movements: Extraocular movements intact.      Pupils: Pupils are equal, round, and reactive to light.   Cardiovascular:      Rate and Rhythm: Normal rate and regular rhythm.      Pulses: Normal pulses.   Pulmonary:      Effort: Pulmonary effort is normal.   Musculoskeletal:         General: Normal range of motion.      Right lower leg: No edema.      Left lower leg: No edema.   Skin:     General: Skin is warm.      Capillary Refill: Capillary refill takes less than 2 seconds.   Neurological:      Mental Status: He is alert and oriented to person, place, and time.      Motor: No weakness.      Coordination: Coordination normal.      Gait: Gait normal.      Deep Tendon Reflexes: Reflexes normal.      Comments: Flexion and extension of spine intact however does endorse back pain with flexion       No point tenderness at the spine                  Assessment & Plan:     58 y.o. male with the following -       Back strain   Describes has had back pain off and on for a year. However has been worse for the last 7-10 days. Works as a  in a school.   Starts at the lower neck and goes all the way to the lower back.   Pain is at the spine with no radiation to the sides   Back feels stiff   Denies radiation to the legs   Denies numbness or tingling   Occasional mild numbness at the left leg, lasts a few mins only- this " only happens when he lifts something heavy   No anesthesia in the pelvic region, no unintentional loss of bladder or bowels   Max intensity in 5/10   Has tried ibuprofen, 2 pills of 200 mg once a day that helps   Denies recent trauma, however does describe his job is stressful and he has to mop a lot, furthermore describes his work load has got worse recently/   Ongoing for the last 1 week- 10 days   Plan:  - Likely back sprain with musculoskeletal back pain precipitated by the nature of patient's work   - We discussed use of heating pads , lidocaine gel/pads to help with pain. Trial of diclofenac gel, OTC   - Flexeril 10 mg TID PRN for back pain, advised to avoid taking when driving or working as it will cause somnolence   - Letter for work provided for this acute episode to resolve         I spent a total of 30 minutes with record review, exam, communication with the patient, communication with other providers, and documentation of this encounter.      Return in about 2 months (around 2/1/2024).    Please note that this dictation was created using voice recognition software. I have made every reasonable attempt to correct obvious errors, but I expect that there are errors of grammar and possibly content that I did not discover before finalizing the note.

## 2023-12-01 NOTE — PATIENT INSTRUCTIONS
Thank you for visiting today!    Please follow-up in 2 months     Please follow-up on referrals and schedule an appointment with physical therapy     Please get lab work done at least 5 days before next visit.    Please try and eat healthy, get at least 30 minutes of cardiovascular exercise a day to help keep your health as best as it can be.    If you feel like you are experiencing a medical emergency please seek immediate medical attention at an urgent care or in the emergency department.

## 2023-12-01 NOTE — LETTER
December 1, 2023    To Whom It May Concern:         This is confirmation that Hero Dylon Jamil attended his scheduled appointment with Kevin Boland M.D. on 12/01/23. He will be medically cleared to go back to work on 12/11/23.          If you have any questions please do not hesitate to call me at the phone number listed below.    Sincerely,          Kevin Boland M.D.  [phone]

## 2023-12-05 RX ORDER — DUTASTERIDE 0.5 MG/1
0.5 CAPSULE, LIQUID FILLED ORAL DAILY
Qty: 90 CAPSULE | Refills: 1 | Status: SHIPPED | OUTPATIENT
Start: 2023-12-05

## 2023-12-27 ENCOUNTER — OFFICE VISIT (OUTPATIENT)
Dept: INTERNAL MEDICINE | Facility: OTHER | Age: 58
End: 2023-12-27
Payer: COMMERCIAL

## 2023-12-27 VITALS
TEMPERATURE: 98.4 F | BODY MASS INDEX: 22.88 KG/M2 | HEART RATE: 90 BPM | RESPIRATION RATE: 16 BRPM | HEIGHT: 66 IN | WEIGHT: 142.4 LBS | OXYGEN SATURATION: 97 % | DIASTOLIC BLOOD PRESSURE: 91 MMHG | SYSTOLIC BLOOD PRESSURE: 142 MMHG

## 2023-12-27 DIAGNOSIS — K29.70 VIRAL GASTRITIS: ICD-10-CM

## 2023-12-27 PROCEDURE — 3080F DIAST BP >= 90 MM HG: CPT | Performed by: STUDENT IN AN ORGANIZED HEALTH CARE EDUCATION/TRAINING PROGRAM

## 2023-12-27 PROCEDURE — 99213 OFFICE O/P EST LOW 20 MIN: CPT | Mod: GE | Performed by: STUDENT IN AN ORGANIZED HEALTH CARE EDUCATION/TRAINING PROGRAM

## 2023-12-27 PROCEDURE — 3077F SYST BP >= 140 MM HG: CPT | Performed by: STUDENT IN AN ORGANIZED HEALTH CARE EDUCATION/TRAINING PROGRAM

## 2023-12-27 RX ORDER — LANOLIN ALCOHOL/MO/W.PET/CERES
3 CREAM (GRAM) TOPICAL
Qty: 30 TABLET | Refills: 0 | Status: SHIPPED | OUTPATIENT
Start: 2023-12-27

## 2023-12-27 RX ORDER — SUCRALFATE ORAL 1 G/10ML
1 SUSPENSION ORAL 4 TIMES DAILY
Qty: 414 ML | Refills: 0 | Status: SHIPPED | OUTPATIENT
Start: 2023-12-27

## 2023-12-27 RX ORDER — OMEPRAZOLE 40 MG/1
40 CAPSULE, DELAYED RELEASE ORAL DAILY
Qty: 30 CAPSULE | Refills: 1 | Status: SHIPPED | OUTPATIENT
Start: 2023-12-27 | End: 2024-01-19

## 2023-12-27 RX ORDER — ONDANSETRON 4 MG/1
4 TABLET, FILM COATED ORAL EVERY 6 HOURS PRN
Qty: 20 TABLET | Refills: 0 | Status: SHIPPED | OUTPATIENT
Start: 2023-12-27 | End: 2024-02-01

## 2023-12-27 ASSESSMENT — FIBROSIS 4 INDEX: FIB4 SCORE: 1.65

## 2023-12-27 NOTE — PATIENT INSTRUCTIONS
Thank you for visiting today!  Please follow-up in 4 weeks  You have viral gastritis most likely, this is a stomach bug but should get better within the next week and is causing your muscle aches and your belly pain and nausea.  We will start Zofran for the nausea as well as omeprazole and sucralfate to help calm your stomach down in the meantime and get you feeling better.  Try low-dose melatonin either prescription or over-the-counter to help with sleep  Try a brat diet bananas, rice, toast, bland foods all over for the next few days until you feel better.  Please get lab work done at least 5 days before next visit.  Please try and eat healthy, get at least 30 minutes of cardiovascular exercise a day to help keep your health as best as it can be.  If you have any questions or concerns please feel free to contact us at [phone].  If you feel like you are experiencing a medical emergency please seek immediate medical attention at an urgent care or in the emergency department.

## 2023-12-27 NOTE — LETTER
December 27, 2023    To Whom It May Concern:         This is confirmation that Hero Jamil attended his scheduled appointment with Wellington Tran M.D. on 12/27/23.  Patient is currently ill and being treated for an acute illness and should be cleared to return to work on 1/9/2024.         If you have any questions please do not hesitate to call me at the phone number listed below.    Sincerely,          Wellington Tran M.D.  [phone]

## 2023-12-27 NOTE — LETTER
December 27, 2023    To Whom It May Concern:         This is confirmation that Hero Jamil attended his scheduled appointment with Wellington Tran M.D. on 12/27/23.  Patient is currently ill and being treated for an acute illness and should be cleared to return to work on 1/9/2023.         If you have any questions please do not hesitate to call me at the phone number listed below.    Sincerely,          Wellington Tran M.D.  [phone]

## 2023-12-29 NOTE — PROGRESS NOTES
Established Patient    Patient Care Team:  Kia Tomlinson D.O. as PCP - General (Internal Medicine)    Hero Jamil is a 58 y.o. male who presents today with the following Chief Complaint(s): Follow up for The encounter diagnosis was Viral gastritis.    HPI:  1. Viral gastritis  Patient presents with a 2-day history of epigastric pain patient describes as achy and intermittent, worse after eating, as well as nausea without vomiting, patient denies any constipation, any fevers, chills, or night sweats.  Patient does endorse muscle aches, and some myalgias and some subjective fevers.  Initial complaint was for worsening back pain, patient has been managed conservatively with Dr. Tomlinson PCP, pain initially improved however in the last 2 days patient notes his pain has been worse and he has this nonradiating epigastric pain that has not been relieved by any factors.  Patient is a  at a local school, no new foods, no sick contacts no recent back water trips or travel out of the area.  14 point review of systems negative save for as above below.      ROS:     General: No fevers, chills, night sweats, weight loss or gain  HEENT: No hearing changes, vision changes, eye pain, ear pain, nasal discharge, sore throat  Neck: No swelling in neck  Pulmonary: No shortness of breath, cough, sputum, or hemoptysis  Cardiovascular: No chest pain, palpitations, or LE swelling  GI: No  vomiting, diarrhea, constipation, hematochezia or melena  : No dysuria or frequency  Neuro: No focal weakness, no general weakness, no headaches, no lightheadedness, no dizziness  Psych: No anxiety or depression    History reviewed. No pertinent past medical history.  Social History     Tobacco Use    Smoking status: Never    Smokeless tobacco: Never   Vaping Use    Vaping Use: Never used   Substance Use Topics    Alcohol use: Never    Drug use: Never     Current Outpatient Medications   Medication Sig Dispense Refill     "ondansetron (ZOFRAN) 4 MG Tab tablet Take 1 Tablet by mouth every 6 hours as needed for Nausea/Vomiting for up to 20 doses. 20 Tablet 0    omeprazole (PRILOSEC) 40 MG delayed-release capsule Take 1 Capsule by mouth every day. 30 Capsule 1    melatonin 3 MG Tab Take 1 Tablet by mouth at bedtime. 30 Tablet 0    sucralfate (CARAFATE) 1 GM/10ML Suspension Take 10 mL by mouth 4 times a day. 414 mL 0    dutasteride (AVODART) 0.5 MG capsule TAKE 1 CAPSULE BY MOUTH EVERY DAY 90 Capsule 1    cyclobenzaprine (FLEXERIL) 10 mg Tab Take 1 Tablet by mouth 3 times a day as needed for Muscle Spasms or Moderate Pain. 60 Tablet 0    atorvastatin (LIPITOR) 10 MG Tab Take 1 Tablet by mouth every evening. 60 Tablet 3    hydrocortisone (ANUSOL-HC) 25 MG Suppos Insert 1 Suppository into the rectum every 12 hours. (Patient not taking: Reported on 12/1/2023) 24 Suppository 3    tamsulosin (FLOMAX) 0.4 MG capsule Take 2 Capsules by mouth every day. (Patient not taking: Reported on 12/27/2023) 90 Capsule 3     No current facility-administered medications for this visit.       Physical Exam:  BP (!) 142/91 (BP Location: Left arm, Patient Position: Sitting, BP Cuff Size: Adult)   Pulse 90   Temp 36.9 °C (98.4 °F) (Temporal)   Resp 16   Ht 1.676 m (5' 6\")   Wt 64.6 kg (142 lb 6.4 oz)   SpO2 97%   BMI 22.98 kg/m²   General: Well developed, well nourished male, in no distress.  HEENT: NC/AT, PERRL, EOMI, no scleral icterus or conjunctival pallor, fair dentition/denture in, no nasal discharge or oral erythema or exudates.   Neck: Supple, No cervical or supraclavicular LAD  CV:RRR, no murmurs gallops or rubs, no JVD  Pulm: LCAB, no crackles, rales, rhonchi, or wheezing  GI: Normal bowel sounds, abdomen soft, nontender, nondistended to deep or light palpation in all 4 quadrants, no HSM.  MSK: Radial and dorsalis pedis pulses 2+ and equal bilaterally, respectively.  Strength 5 out of 5 in upper and lower extremities.  No lower extremity " edema  Neuro: Patient is alert and oriented x3, no focal deficits  Psych: Appropriate mood and affect       Assessment and Plan:   1. Viral gastritis  Signs and symptoms consistent with viral gastritis, patient denies previous history of GERD or stomach ulcers, no red flag signs or symptoms, starting to improve slightly, will manage conservatively with Zofran for nausea as needed (patient denies heart history), trial omeprazole and sucralfate, and then patient has had difficulty sleeping with myalgias, patient okay to take Tylenol for myalgias and lower back pain which seems to be a new occurrence laded to viral illness versus his previous likely muscle strain, lower back pain without red flag signs or symptoms.  - Tylenol as needed  - Counseled patient on brat diet  - Strict return precautions given  - ondansetron (ZOFRAN) 4 MG Tab tablet; Take 1 Tablet by mouth every 6 hours as needed for Nausea/Vomiting for up to 20 doses.  Dispense: 20 Tablet; Refill: 0  - omeprazole (PRILOSEC) 40 MG delayed-release capsule; Take 1 Capsule by mouth every day.  Dispense: 30 Capsule; Refill: 1  - melatonin 3 MG Tab; Take 1 Tablet by mouth at bedtime.  Dispense: 30 Tablet; Refill: 0  - sucralfate (CARAFATE) 1 GM/10ML Suspension; Take 10 mL by mouth 4 times a day.  Dispense: 414 mL; Refill: 0             Return in about 4 weeks (around 1/24/2024).    Patient Instructions   Thank you for visiting today!  Please follow-up in 4 weeks  You have viral gastritis most likely, this is a stomach bug but should get better within the next week and is causing your muscle aches and your belly pain and nausea.  We will start Zofran for the nausea as well as omeprazole and sucralfate to help calm your stomach down in the meantime and get you feeling better.  Try low-dose melatonin either prescription or over-the-counter to help with sleep  Try a brat diet bananas, rice, toast, bland foods all over for the next few days until you feel  better.  Please get lab work done at least 5 days before next visit.  Please try and eat healthy, get at least 30 minutes of cardiovascular exercise a day to help keep your health as best as it can be.  If you have any questions or concerns please feel free to contact us at [phone].  If you feel like you are experiencing a medical emergency please seek immediate medical attention at an urgent care or in the emergency department.       Wellington Tran M.D. PGY 3  [facility]    This note was created using voice recognition software.  While every attempt is made to ensure accuracy of transcription, occasionally errors occur.

## 2024-01-18 ENCOUNTER — APPOINTMENT (OUTPATIENT)
Dept: PHYSICAL THERAPY | Facility: REHABILITATION | Age: 59
End: 2024-01-18
Attending: INTERNAL MEDICINE
Payer: COMMERCIAL

## 2024-01-18 DIAGNOSIS — K21.9 GASTROESOPHAGEAL REFLUX DISEASE, UNSPECIFIED WHETHER ESOPHAGITIS PRESENT: ICD-10-CM

## 2024-01-18 DIAGNOSIS — K29.70 VIRAL GASTRITIS: ICD-10-CM

## 2024-01-19 RX ORDER — OMEPRAZOLE 40 MG/1
40 CAPSULE, DELAYED RELEASE ORAL DAILY
Qty: 90 CAPSULE | Refills: 1 | Status: SHIPPED | OUTPATIENT
Start: 2024-01-19

## 2024-01-25 ENCOUNTER — APPOINTMENT (OUTPATIENT)
Dept: PHYSICAL THERAPY | Facility: REHABILITATION | Age: 59
End: 2024-01-25
Attending: INTERNAL MEDICINE
Payer: COMMERCIAL

## 2024-02-01 ENCOUNTER — APPOINTMENT (OUTPATIENT)
Dept: PHYSICAL THERAPY | Facility: REHABILITATION | Age: 59
End: 2024-02-01
Attending: INTERNAL MEDICINE
Payer: COMMERCIAL

## 2024-02-08 ENCOUNTER — APPOINTMENT (OUTPATIENT)
Dept: PHYSICAL THERAPY | Facility: REHABILITATION | Age: 59
End: 2024-02-08
Attending: INTERNAL MEDICINE
Payer: COMMERCIAL

## 2024-02-15 ENCOUNTER — APPOINTMENT (OUTPATIENT)
Dept: PHYSICAL THERAPY | Facility: REHABILITATION | Age: 59
End: 2024-02-15
Attending: INTERNAL MEDICINE
Payer: COMMERCIAL

## 2024-02-22 ENCOUNTER — APPOINTMENT (OUTPATIENT)
Dept: PHYSICAL THERAPY | Facility: REHABILITATION | Age: 59
End: 2024-02-22
Attending: INTERNAL MEDICINE
Payer: COMMERCIAL

## 2024-02-29 ENCOUNTER — APPOINTMENT (OUTPATIENT)
Dept: PHYSICAL THERAPY | Facility: REHABILITATION | Age: 59
End: 2024-02-29
Attending: INTERNAL MEDICINE
Payer: COMMERCIAL

## 2024-03-07 ENCOUNTER — APPOINTMENT (OUTPATIENT)
Dept: PHYSICAL THERAPY | Facility: REHABILITATION | Age: 59
End: 2024-03-07
Attending: INTERNAL MEDICINE
Payer: COMMERCIAL

## 2024-03-20 ENCOUNTER — OFFICE VISIT (OUTPATIENT)
Dept: INTERNAL MEDICINE | Facility: OTHER | Age: 59
End: 2024-03-20
Payer: COMMERCIAL

## 2024-03-20 VITALS
SYSTOLIC BLOOD PRESSURE: 126 MMHG | TEMPERATURE: 97.6 F | HEART RATE: 86 BPM | BODY MASS INDEX: 21.95 KG/M2 | WEIGHT: 136.6 LBS | OXYGEN SATURATION: 98 % | DIASTOLIC BLOOD PRESSURE: 80 MMHG | HEIGHT: 66 IN

## 2024-03-20 DIAGNOSIS — Z12.11 COLON CANCER SCREENING: ICD-10-CM

## 2024-03-20 DIAGNOSIS — R35.1 BENIGN PROSTATIC HYPERPLASIA WITH NOCTURIA: ICD-10-CM

## 2024-03-20 DIAGNOSIS — N40.1 BENIGN PROSTATIC HYPERPLASIA WITH NOCTURIA: ICD-10-CM

## 2024-03-20 DIAGNOSIS — H81.13 BPPV (BENIGN PAROXYSMAL POSITIONAL VERTIGO), BILATERAL: ICD-10-CM

## 2024-03-20 DIAGNOSIS — R42 DIZZINESS: ICD-10-CM

## 2024-03-20 DIAGNOSIS — N20.0 KIDNEY STONES: ICD-10-CM

## 2024-03-20 PROCEDURE — 99214 OFFICE O/P EST MOD 30 MIN: CPT | Performed by: INTERNAL MEDICINE

## 2024-03-20 PROCEDURE — 3074F SYST BP LT 130 MM HG: CPT | Performed by: INTERNAL MEDICINE

## 2024-03-20 PROCEDURE — 93000 ELECTROCARDIOGRAM COMPLETE: CPT | Performed by: INTERNAL MEDICINE

## 2024-03-20 PROCEDURE — 3079F DIAST BP 80-89 MM HG: CPT | Performed by: INTERNAL MEDICINE

## 2024-03-20 ASSESSMENT — FIBROSIS 4 INDEX: FIB4 SCORE: 1.65

## 2024-03-20 ASSESSMENT — PATIENT HEALTH QUESTIONNAIRE - PHQ9: CLINICAL INTERPRETATION OF PHQ2 SCORE: 0

## 2024-03-20 NOTE — PROGRESS NOTES
3/20/2024  Chief Complaint   Patient presents with    Follow-Up    Dizziness     Ongoing for the past 2 weeks, worse at night       HISTORY OF PRESENT ILLNESS: Patient is a 58 y.o. male established patient who presents today for dizziness.  Patient reports a 2 week history of generalized fatigue and dizziness. Dizziness suddenly started about 2 weeks ago, he thought symptoms would improve but has not. Dizziness is most significant after work, while laying down and when waking up in the morning. Dizziness will improved after about 1-2 hours after waking. Reports associated head fullness and generalized muscle fatigue, but denies any fevers, chills, nausea, vomiting, nasal congestion, changes in vision, no hearing loss, focal weakness, facial droop or slurred speech. Denies any dizziness with standing from a seating position. Denies chest pain, has mild shortness of breath when he is feeling dizzy. He denies any recent illness. He was seen in December for epigastric pain with nausea, subjective fevers, muscle aches. No diarrhea. He was diagnosed with viral gastritis and started on Omeprazole and Carafate. Epigastric pain and nausea has resolved.   He reports an episodes of dizziness several years ago, which resolved on its own. He has been drinking adequate amounts of fluid.     He continues to take Avodart and Flomax for BPH. Followed by Urology.  Also, has history of kidney stone. Patient denies any flank pain, dysuria or urgency. Had is to have surveillance of kidney stone with [facility] every 5 years.      Patient Active Problem List    Diagnosis Date Noted    Kidney stones 05/01/2023    Dyslipidemia 05/01/2023    Benign prostatic hyperplasia with nocturia 05/20/2022       Allergies:Patient has no known allergies.    Current Outpatient Medications   Medication Sig Dispense Refill    omeprazole (PRILOSEC) 40 MG delayed-release capsule TAKE 1 CAPSULE BY MOUTH EVERY DAY 90 Capsule 1    melatonin 3 MG Tab Take 1 Tablet by  "mouth at bedtime. 30 Tablet 0    sucralfate (CARAFATE) 1 GM/10ML Suspension Take 10 mL by mouth 4 times a day. 414 mL 0    dutasteride (AVODART) 0.5 MG capsule TAKE 1 CAPSULE BY MOUTH EVERY DAY 90 Capsule 1    cyclobenzaprine (FLEXERIL) 10 mg Tab Take 1 Tablet by mouth 3 times a day as needed for Muscle Spasms or Moderate Pain. 60 Tablet 0    tamsulosin (FLOMAX) 0.4 MG capsule Take 2 Capsules by mouth every day. 90 Capsule 3    atorvastatin (LIPITOR) 10 MG Tab Take 1 Tablet by mouth every evening. 60 Tablet 3     No current facility-administered medications for this visit.       Social History     Tobacco Use    Smoking status: Never    Smokeless tobacco: Never   Vaping Use    Vaping Use: Never used   Substance Use Topics    Alcohol use: Never    Drug use: Never       No family history on file.      Review of Systems:   Review of Systems   Constitutional:  Positive for malaise/fatigue. Negative for chills, fever and weight loss.   HENT:  Negative for congestion, hearing loss, sore throat and tinnitus.    Eyes:  Negative for blurred vision, double vision and photophobia.   Respiratory:  Positive for shortness of breath. Negative for cough.    Cardiovascular:  Negative for chest pain, palpitations, orthopnea and leg swelling.   Gastrointestinal:  Negative for abdominal pain, diarrhea, nausea and vomiting.   Genitourinary:  Negative for dysuria, flank pain, hematuria and urgency.   Musculoskeletal:  Positive for myalgias.   Skin:  Negative for itching and rash.   Neurological:  Positive for dizziness. Negative for tingling, focal weakness and headaches.       Exam:  /80 (BP Location: Left arm, Patient Position: Sitting, BP Cuff Size: Adult)   Pulse 86   Temp 36.4 °C (97.6 °F) (Temporal)   Ht 1.676 m (5' 6\")   Wt 62 kg (136 lb 9.6 oz)   SpO2 98%  Body mass index is 22.05 kg/m².  Physical Exam  Constitutional:       General: He is not in acute distress.     Appearance: He is normal weight.   HENT:      Head: " Normocephalic and atraumatic.      Right Ear: Tympanic membrane and ear canal normal. There is no impacted cerumen.      Left Ear: Tympanic membrane and ear canal normal. There is no impacted cerumen.      Nose: Nose normal.      Mouth/Throat:      Mouth: Mucous membranes are moist.      Pharynx: Oropharynx is clear.   Eyes:      Extraocular Movements: Extraocular movements intact.      Conjunctiva/sclera: Conjunctivae normal.      Pupils: Pupils are equal, round, and reactive to light.   Cardiovascular:      Rate and Rhythm: Normal rate and regular rhythm.      Pulses: Normal pulses.      Heart sounds: No murmur heard.     No friction rub. No gallop.   Pulmonary:      Effort: Pulmonary effort is normal.      Breath sounds: Normal breath sounds. No wheezing, rhonchi or rales.   Abdominal:      General: Abdomen is flat.      Palpations: Abdomen is soft.   Musculoskeletal:      Cervical back: Neck supple. No rigidity.      Right lower leg: No edema.      Left lower leg: No edema.   Skin:     General: Skin is warm and dry.   Neurological:      General: No focal deficit present.      Mental Status: He is alert.      Cranial Nerves: No cranial nerve deficit.      Gait: Gait normal.   Psychiatric:         Mood and Affect: Mood normal.         Behavior: Behavior normal.         Thought Content: Thought content normal.         Judgment: Judgment normal.         Assessment/Plan:     Dizziness  BPPV (benign paroxysmal positional vertigo), bilateral  Orthostatics negative. EKG without arrhythmia/blocks, ST depression <1mm  Symptoms appear to be consistent with BPPV, worse with lying down, however, Nahed-hallpike negative.  Given unclear cause, will obtain CT head  Will obtain labs and refer to PT to trial vestibular exercises.  Handout for home exercise given to patient.  - CBC WITH DIFFERENTIAL; Future  - Comp Metabolic Panel; Future  - EKG - Clinic Performed  - TSH WITH REFLEX TO FT4; Future  - Orthostatic Blood Pressure  -  CT-HEAD W/O; Future  - Referral to Physical Therapy    Colon cancer screening  - Referral to GI for Colonoscopy    BPH  Nephrolithiasis  Continue Avodart and Flomax  Followed by Urology    All imaging results and lab results and consult notes are reviewed at this visit.  Followup: Return in about 4 weeks (around 4/17/2024).

## 2024-03-21 ASSESSMENT — ENCOUNTER SYMPTOMS
VOMITING: 0
HEADACHES: 0
SORE THROAT: 0
PALPITATIONS: 0
FOCAL WEAKNESS: 0
COUGH: 0
FLANK PAIN: 0
PHOTOPHOBIA: 0
BLURRED VISION: 0
DOUBLE VISION: 0
ABDOMINAL PAIN: 0
CHILLS: 0
SHORTNESS OF BREATH: 1
FEVER: 0
DIARRHEA: 0
MYALGIAS: 1
NAUSEA: 0
DIZZINESS: 1
WEIGHT LOSS: 0
ORTHOPNEA: 0
TINGLING: 0

## 2024-03-29 ENCOUNTER — HOSPITAL ENCOUNTER (OUTPATIENT)
Dept: RADIOLOGY | Facility: MEDICAL CENTER | Age: 59
End: 2024-03-29
Attending: INTERNAL MEDICINE
Payer: COMMERCIAL

## 2024-03-29 DIAGNOSIS — R42 DIZZINESS: ICD-10-CM

## 2024-03-29 PROCEDURE — 70450 CT HEAD/BRAIN W/O DYE: CPT

## 2024-04-02 ENCOUNTER — TELEPHONE (OUTPATIENT)
Dept: INTERNAL MEDICINE | Facility: OTHER | Age: 59
End: 2024-04-02
Payer: COMMERCIAL

## 2024-04-02 NOTE — TELEPHONE ENCOUNTER
Phone Number Called: [phone]    Call outcome: Did not leave a detailed message. Requested patient to call back.    Message: lvm to call back about imaging results

## 2024-04-02 NOTE — TELEPHONE ENCOUNTER
Caller Name: Hero   Call Back Number: [phone]    How would the patient prefer to be contacted with a response: Phone call OK to leave a detailed message    Patient was returning a voicemail left earlier today. I informed patient that Valery attempted to relay MRI results. Informed patient that MRI results were normal

## 2024-04-22 ENCOUNTER — HOSPITAL ENCOUNTER (OUTPATIENT)
Dept: LAB | Facility: MEDICAL CENTER | Age: 59
End: 2024-04-22
Attending: INTERNAL MEDICINE
Payer: COMMERCIAL

## 2024-04-22 DIAGNOSIS — Z11.4 SCREENING FOR HIV (HUMAN IMMUNODEFICIENCY VIRUS): ICD-10-CM

## 2024-04-22 DIAGNOSIS — Z11.59 NEED FOR HEPATITIS C SCREENING TEST: ICD-10-CM

## 2024-04-22 DIAGNOSIS — E78.5 DYSLIPIDEMIA: ICD-10-CM

## 2024-04-22 DIAGNOSIS — R42 DIZZINESS: ICD-10-CM

## 2024-04-22 LAB
ALBUMIN SERPL BCP-MCNC: 4.2 G/DL (ref 3.2–4.9)
ALBUMIN/GLOB SERPL: 1.2 G/DL
ALP SERPL-CCNC: 58 U/L (ref 30–99)
ALT SERPL-CCNC: 22 U/L (ref 2–50)
ANION GAP SERPL CALC-SCNC: 10 MMOL/L (ref 7–16)
AST SERPL-CCNC: 20 U/L (ref 12–45)
BASOPHILS # BLD AUTO: 0.3 % (ref 0–1.8)
BASOPHILS # BLD: 0.02 K/UL (ref 0–0.12)
BILIRUB SERPL-MCNC: 0.5 MG/DL (ref 0.1–1.5)
BUN SERPL-MCNC: 19 MG/DL (ref 8–22)
CALCIUM ALBUM COR SERPL-MCNC: 9.2 MG/DL (ref 8.5–10.5)
CALCIUM SERPL-MCNC: 9.4 MG/DL (ref 8.5–10.5)
CHLORIDE SERPL-SCNC: 109 MMOL/L (ref 96–112)
CHOLEST SERPL-MCNC: 150 MG/DL (ref 100–199)
CO2 SERPL-SCNC: 24 MMOL/L (ref 20–33)
CREAT SERPL-MCNC: 0.74 MG/DL (ref 0.5–1.4)
EOSINOPHIL # BLD AUTO: 0.05 K/UL (ref 0–0.51)
EOSINOPHIL NFR BLD: 0.8 % (ref 0–6.9)
ERYTHROCYTE [DISTWIDTH] IN BLOOD BY AUTOMATED COUNT: 44.4 FL (ref 35.9–50)
EST. AVERAGE GLUCOSE BLD GHB EST-MCNC: 111 MG/DL
GFR SERPLBLD CREATININE-BSD FMLA CKD-EPI: 103 ML/MIN/1.73 M 2
GLOBULIN SER CALC-MCNC: 3.6 G/DL (ref 1.9–3.5)
GLUCOSE SERPL-MCNC: 104 MG/DL (ref 65–99)
HBA1C MFR BLD: 5.5 % (ref 4–5.6)
HCT VFR BLD AUTO: 46.3 % (ref 42–52)
HCV AB SER QL: NORMAL
HDLC SERPL-MCNC: 65 MG/DL
HGB BLD-MCNC: 15.7 G/DL (ref 14–18)
HIV 1+2 AB+HIV1 P24 AG SERPL QL IA: NORMAL
IMM GRANULOCYTES # BLD AUTO: 0.02 K/UL (ref 0–0.11)
IMM GRANULOCYTES NFR BLD AUTO: 0.3 % (ref 0–0.9)
LDLC SERPL CALC-MCNC: 71 MG/DL
LYMPHOCYTES # BLD AUTO: 1.65 K/UL (ref 1–4.8)
LYMPHOCYTES NFR BLD: 26.5 % (ref 22–41)
MCH RBC QN AUTO: 32 PG (ref 27–33)
MCHC RBC AUTO-ENTMCNC: 33.9 G/DL (ref 32.3–36.5)
MCV RBC AUTO: 94.3 FL (ref 81.4–97.8)
MONOCYTES # BLD AUTO: 0.35 K/UL (ref 0–0.85)
MONOCYTES NFR BLD AUTO: 5.6 % (ref 0–13.4)
NEUTROPHILS # BLD AUTO: 4.13 K/UL (ref 1.82–7.42)
NEUTROPHILS NFR BLD: 66.5 % (ref 44–72)
NRBC # BLD AUTO: 0 K/UL
NRBC BLD-RTO: 0 /100 WBC (ref 0–0.2)
PLATELET # BLD AUTO: 174 K/UL (ref 164–446)
PMV BLD AUTO: 11.5 FL (ref 9–12.9)
POTASSIUM SERPL-SCNC: 4.2 MMOL/L (ref 3.6–5.5)
PROT SERPL-MCNC: 7.8 G/DL (ref 6–8.2)
RBC # BLD AUTO: 4.91 M/UL (ref 4.7–6.1)
SODIUM SERPL-SCNC: 143 MMOL/L (ref 135–145)
TRIGL SERPL-MCNC: 71 MG/DL (ref 0–149)
TSH SERPL DL<=0.005 MIU/L-ACNC: 3.96 UIU/ML (ref 0.38–5.33)
WBC # BLD AUTO: 6.2 K/UL (ref 4.8–10.8)

## 2024-04-22 PROCEDURE — 84443 ASSAY THYROID STIM HORMONE: CPT

## 2024-04-22 PROCEDURE — 83036 HEMOGLOBIN GLYCOSYLATED A1C: CPT

## 2024-04-22 PROCEDURE — 87389 HIV-1 AG W/HIV-1&-2 AB AG IA: CPT

## 2024-04-22 PROCEDURE — 86803 HEPATITIS C AB TEST: CPT

## 2024-04-22 PROCEDURE — 80053 COMPREHEN METABOLIC PANEL: CPT

## 2024-04-22 PROCEDURE — 85025 COMPLETE CBC W/AUTO DIFF WBC: CPT

## 2024-04-22 PROCEDURE — 36415 COLL VENOUS BLD VENIPUNCTURE: CPT

## 2024-04-22 PROCEDURE — 80061 LIPID PANEL: CPT

## 2024-04-25 ENCOUNTER — HOSPITAL ENCOUNTER (EMERGENCY)
Facility: MEDICAL CENTER | Age: 59
End: 2024-04-25
Attending: EMERGENCY MEDICINE
Payer: COMMERCIAL

## 2024-04-25 ENCOUNTER — APPOINTMENT (OUTPATIENT)
Dept: RADIOLOGY | Facility: MEDICAL CENTER | Age: 59
End: 2024-04-25
Attending: EMERGENCY MEDICINE
Payer: COMMERCIAL

## 2024-04-25 ENCOUNTER — OFFICE VISIT (OUTPATIENT)
Dept: INTERNAL MEDICINE | Facility: OTHER | Age: 59
End: 2024-04-25
Payer: COMMERCIAL

## 2024-04-25 VITALS
SYSTOLIC BLOOD PRESSURE: 127 MMHG | HEART RATE: 74 BPM | WEIGHT: 137.35 LBS | RESPIRATION RATE: 18 BRPM | OXYGEN SATURATION: 98 % | BODY MASS INDEX: 22.07 KG/M2 | TEMPERATURE: 98.2 F | DIASTOLIC BLOOD PRESSURE: 81 MMHG | HEIGHT: 66 IN

## 2024-04-25 VITALS
HEART RATE: 72 BPM | DIASTOLIC BLOOD PRESSURE: 84 MMHG | HEIGHT: 66 IN | OXYGEN SATURATION: 96 % | BODY MASS INDEX: 22.53 KG/M2 | SYSTOLIC BLOOD PRESSURE: 131 MMHG | TEMPERATURE: 98.1 F | WEIGHT: 140.2 LBS

## 2024-04-25 DIAGNOSIS — M54.6 ACUTE MIDLINE THORACIC BACK PAIN: ICD-10-CM

## 2024-04-25 DIAGNOSIS — R07.9 CHEST PAIN, UNSPECIFIED TYPE: ICD-10-CM

## 2024-04-25 DIAGNOSIS — I95.9 HYPOTENSION, UNSPECIFIED HYPOTENSION TYPE: ICD-10-CM

## 2024-04-25 DIAGNOSIS — R42 DIZZINESS: ICD-10-CM

## 2024-04-25 LAB
ALBUMIN SERPL BCP-MCNC: 4 G/DL (ref 3.2–4.9)
ALBUMIN/GLOB SERPL: 1.2 G/DL
ALP SERPL-CCNC: 65 U/L (ref 30–99)
ALT SERPL-CCNC: 40 U/L (ref 2–50)
ANION GAP SERPL CALC-SCNC: 11 MMOL/L (ref 7–16)
AST SERPL-CCNC: 76 U/L (ref 12–45)
BASOPHILS # BLD AUTO: 0.4 % (ref 0–1.8)
BASOPHILS # BLD: 0.04 K/UL (ref 0–0.12)
BILIRUB SERPL-MCNC: 0.6 MG/DL (ref 0.1–1.5)
BUN SERPL-MCNC: 13 MG/DL (ref 8–22)
CALCIUM ALBUM COR SERPL-MCNC: 8.6 MG/DL (ref 8.5–10.5)
CALCIUM SERPL-MCNC: 8.6 MG/DL (ref 8.5–10.5)
CHLORIDE SERPL-SCNC: 109 MMOL/L (ref 96–112)
CO2 SERPL-SCNC: 22 MMOL/L (ref 20–33)
CREAT SERPL-MCNC: 0.75 MG/DL (ref 0.5–1.4)
EKG IMPRESSION: NORMAL
EOSINOPHIL # BLD AUTO: 0.03 K/UL (ref 0–0.51)
EOSINOPHIL NFR BLD: 0.3 % (ref 0–6.9)
ERYTHROCYTE [DISTWIDTH] IN BLOOD BY AUTOMATED COUNT: 42.3 FL (ref 35.9–50)
GFR SERPLBLD CREATININE-BSD FMLA CKD-EPI: 103 ML/MIN/1.73 M 2
GLOBULIN SER CALC-MCNC: 3.4 G/DL (ref 1.9–3.5)
GLUCOSE SERPL-MCNC: 107 MG/DL (ref 65–99)
HCT VFR BLD AUTO: 43.5 % (ref 42–52)
HGB BLD-MCNC: 14.9 G/DL (ref 14–18)
IMM GRANULOCYTES # BLD AUTO: 0.04 K/UL (ref 0–0.11)
IMM GRANULOCYTES NFR BLD AUTO: 0.4 % (ref 0–0.9)
LIPASE SERPL-CCNC: 40 U/L (ref 11–82)
LYMPHOCYTES # BLD AUTO: 1.51 K/UL (ref 1–4.8)
LYMPHOCYTES NFR BLD: 15.7 % (ref 22–41)
MCH RBC QN AUTO: 31.6 PG (ref 27–33)
MCHC RBC AUTO-ENTMCNC: 34.3 G/DL (ref 32.3–36.5)
MCV RBC AUTO: 92.4 FL (ref 81.4–97.8)
MONOCYTES # BLD AUTO: 0.44 K/UL (ref 0–0.85)
MONOCYTES NFR BLD AUTO: 4.6 % (ref 0–13.4)
NEUTROPHILS # BLD AUTO: 7.56 K/UL (ref 1.82–7.42)
NEUTROPHILS NFR BLD: 78.6 % (ref 44–72)
NRBC # BLD AUTO: 0 K/UL
NRBC BLD-RTO: 0 /100 WBC (ref 0–0.2)
PLATELET # BLD AUTO: 163 K/UL (ref 164–446)
PMV BLD AUTO: 11.1 FL (ref 9–12.9)
POTASSIUM SERPL-SCNC: 4.1 MMOL/L (ref 3.6–5.5)
PROT SERPL-MCNC: 7.4 G/DL (ref 6–8.2)
RBC # BLD AUTO: 4.71 M/UL (ref 4.7–6.1)
SODIUM SERPL-SCNC: 142 MMOL/L (ref 135–145)
TROPONIN T SERPL-MCNC: <6 NG/L (ref 6–19)
TROPONIN T SERPL-MCNC: <6 NG/L (ref 6–19)
WBC # BLD AUTO: 9.6 K/UL (ref 4.8–10.8)

## 2024-04-25 PROCEDURE — 71045 X-RAY EXAM CHEST 1 VIEW: CPT

## 2024-04-25 PROCEDURE — 3075F SYST BP GE 130 - 139MM HG: CPT | Performed by: INTERNAL MEDICINE

## 2024-04-25 PROCEDURE — 80053 COMPREHEN METABOLIC PANEL: CPT

## 2024-04-25 PROCEDURE — 84484 ASSAY OF TROPONIN QUANT: CPT | Mod: 91

## 2024-04-25 PROCEDURE — 700117 HCHG RX CONTRAST REV CODE 255: Performed by: EMERGENCY MEDICINE

## 2024-04-25 PROCEDURE — 3079F DIAST BP 80-89 MM HG: CPT | Performed by: INTERNAL MEDICINE

## 2024-04-25 PROCEDURE — 85025 COMPLETE CBC W/AUTO DIFF WBC: CPT

## 2024-04-25 PROCEDURE — 99214 OFFICE O/P EST MOD 30 MIN: CPT | Performed by: INTERNAL MEDICINE

## 2024-04-25 PROCEDURE — 93005 ELECTROCARDIOGRAM TRACING: CPT

## 2024-04-25 PROCEDURE — 71275 CT ANGIOGRAPHY CHEST: CPT

## 2024-04-25 PROCEDURE — 83690 ASSAY OF LIPASE: CPT

## 2024-04-25 PROCEDURE — 36415 COLL VENOUS BLD VENIPUNCTURE: CPT

## 2024-04-25 PROCEDURE — 700105 HCHG RX REV CODE 258: Performed by: EMERGENCY MEDICINE

## 2024-04-25 PROCEDURE — 93005 ELECTROCARDIOGRAM TRACING: CPT | Performed by: EMERGENCY MEDICINE

## 2024-04-25 PROCEDURE — 99284 EMERGENCY DEPT VISIT MOD MDM: CPT

## 2024-04-25 RX ORDER — SODIUM CHLORIDE 9 MG/ML
1000 INJECTION, SOLUTION INTRAVENOUS ONCE
Status: COMPLETED | OUTPATIENT
Start: 2024-04-25 | End: 2024-04-25

## 2024-04-25 RX ADMIN — SODIUM CHLORIDE 1000 ML: 9 INJECTION, SOLUTION INTRAVENOUS at 17:28

## 2024-04-25 RX ADMIN — IOHEXOL 100 ML: 350 INJECTION, SOLUTION INTRAVENOUS at 19:14

## 2024-04-25 ASSESSMENT — FIBROSIS 4 INDEX
FIB4 SCORE: 1.49
FIB4 SCORE: 1.49

## 2024-04-25 ASSESSMENT — PAIN DESCRIPTION - PAIN TYPE: TYPE: ACUTE PAIN

## 2024-04-25 NOTE — PROGRESS NOTES
4/25/2024  Chief Complaint   Patient presents with    Lab Results       HISTORY OF PRESENT ILLNESS: Patient is a 59 y.o. male established patient who presents tpday for follow-up and lab results.   He reports that his dizziness has improved some, he continues to have dizziness most notable with lying flat. Reports he has spoken to PT, but has not scheduled any appointment. He denies dizziness with activity or standing up, no palpitations, nausea, vomiting or chest pain.    During the appointment, patient began having chest and back pain. Pain was intense and heavy, also having some shortness of breath.  BP was obtained, initially BP reading on the left arm was 67/34, repeat 77/43. Right arm reading 89/52.  Manual left arm reading 89/54. Patient denies any prior history of chest pain, shortness of breath or arm/numbness and tingling.   Patient has had back pain for a few months, seen in 12/2023. No significant CAD history. He was diaphoretic on exam, EMS was called and EKG in clinic without acute changes. BP did improve, however, patient continues to c/o chest and back pain and was taken to ER for further evaluation.      Patient Active Problem List    Diagnosis Date Noted    Kidney stones 05/01/2023    Dyslipidemia 05/01/2023    Benign prostatic hyperplasia with nocturia 05/20/2022       Allergies:Patient has no known allergies.    Current Outpatient Medications   Medication Sig Dispense Refill    omeprazole (PRILOSEC) 40 MG delayed-release capsule TAKE 1 CAPSULE BY MOUTH EVERY DAY 90 Capsule 1    melatonin 3 MG Tab Take 1 Tablet by mouth at bedtime. 30 Tablet 0    sucralfate (CARAFATE) 1 GM/10ML Suspension Take 10 mL by mouth 4 times a day. 414 mL 0    dutasteride (AVODART) 0.5 MG capsule TAKE 1 CAPSULE BY MOUTH EVERY DAY 90 Capsule 1    cyclobenzaprine (FLEXERIL) 10 mg Tab Take 1 Tablet by mouth 3 times a day as needed for Muscle Spasms or Moderate Pain. 60 Tablet 0    tamsulosin (FLOMAX) 0.4 MG capsule Take 2  "Capsules by mouth every day. 90 Capsule 3    atorvastatin (LIPITOR) 10 MG Tab Take 1 Tablet by mouth every evening. 60 Tablet 3     No current facility-administered medications for this visit.       Social History     Tobacco Use    Smoking status: Never    Smokeless tobacco: Never   Vaping Use    Vaping Use: Never used   Substance Use Topics    Alcohol use: Never    Drug use: Never       No family history on file.      Review of Systems:   Review of Systems   Constitutional:  Negative for chills, fever, malaise/fatigue and weight loss.   HENT:  Negative for congestion and sore throat.    Eyes:  Negative for blurred vision and double vision.   Respiratory:  Positive for shortness of breath. Negative for cough and wheezing.    Cardiovascular:  Positive for chest pain. Negative for palpitations, orthopnea and leg swelling.   Gastrointestinal:  Negative for abdominal pain, constipation, diarrhea, nausea and vomiting.   Genitourinary:  Negative for dysuria and urgency.   Musculoskeletal:  Positive for back pain.   Neurological:  Positive for dizziness.       Exam:  /84 (BP Location: Left arm, Patient Position: Sitting, BP Cuff Size: Adult)   Pulse 72   Temp 36.7 °C (98.1 °F) (Temporal)   Ht 1.676 m (5' 6\")   Wt 63.6 kg (140 lb 3.2 oz)   SpO2 96%  Body mass index is 22.63 kg/m².  Physical Exam  Constitutional:       General: He is in acute distress.      Appearance: He is diaphoretic.   HENT:      Nose: Nose normal.   Eyes:      General: No scleral icterus.     Extraocular Movements: Extraocular movements intact.      Conjunctiva/sclera: Conjunctivae normal.   Cardiovascular:      Rate and Rhythm: Normal rate and regular rhythm.      Heart sounds: Normal heart sounds.   Pulmonary:      Effort: Pulmonary effort is normal.      Breath sounds: Normal breath sounds.   Abdominal:      General: Bowel sounds are normal.      Palpations: Abdomen is soft.   Musculoskeletal:         General: No tenderness.      Right " lower leg: No edema.      Left lower leg: No edema.   Skin:     General: Skin is warm.   Neurological:      General: No focal deficit present.      Mental Status: He is alert.   Psychiatric:         Mood and Affect: Mood normal.         Behavior: Behavior normal.         Thought Content: Thought content normal.         Judgment: Judgment normal.       Assessment/Plan:     Chest pain, unspecified type  Hypotension  Acute chest pain and back pain during office visit, patient without significant risk factors.   EKG from EMS without acute changes, however, chest/back pain persistent  Also, was noted to be hypotensive during initially episode which did improve.   Given persistent chest/back pain, patient take to ER for evaluation    Dizziness  Has improved some, but still has dizziness with lying down.  Prior orthostatic negative, Balm-Hallpike was negative, but symptoms of dizziness was reproducible  Has been referred to PT, however, patient has not schedule appt.  CT head has been negative  Consider ECHO and Ziopatch at next appt      All imaging results and lab results and consult notes are reviewed at this visit.  Followup: 1 week

## 2024-04-25 NOTE — ED TRIAGE NOTES
"Chief Complaint   Patient presents with    Chest Pain     Since 1430: pt was at PCP when he had sudden onset of central CP. Pt reports pain has almost resolved since arrival. Pt also endorses upper back pain x several weeks.   -N/V, -dizziness, -SOB       60 yo M to triage for above complaint. PIV placed by EMS, pt recived 250 ml NS and 324 ASA. Pre-hospital EKG showed SR.     CP protocol ordered.      Pt placed in lobby. Pt educated on triage process. Pt encouraged to alert staff for any changes.     Patient and staff wearing appropriate PPE    BP (!) 147/78   Pulse 67   Temp 37.1 °C (98.8 °F) (Temporal)   Resp 16   Ht 1.676 m (5' 6\")   Wt 62.3 kg (137 lb 5.6 oz)   SpO2 99%   BMI 22.17 kg/m²     "

## 2024-04-25 NOTE — ED NOTES
Pt ambulatory back to room Yellow 55 with steady gait. Pt placed into gown and placed on the monitor.  Pt states same complaints  from triage.  Alert and oriented not in acute distress noted.  Chart up for ERP to see.

## 2024-04-26 ASSESSMENT — ENCOUNTER SYMPTOMS
CHILLS: 0
DIARRHEA: 0
FEVER: 0
CONSTIPATION: 0
WEIGHT LOSS: 0
DOUBLE VISION: 0
PALPITATIONS: 0
ORTHOPNEA: 0
SORE THROAT: 0
BACK PAIN: 1
DIZZINESS: 1
ABDOMINAL PAIN: 0
SHORTNESS OF BREATH: 1
BLURRED VISION: 0
WHEEZING: 0
NAUSEA: 0
VOMITING: 0
COUGH: 0

## 2024-04-26 NOTE — ED PROVIDER NOTES
ED Provider Note    CHIEF COMPLAINT  Chief Complaint   Patient presents with    Chest Pain     Since 1430: pt was at PCP when he had sudden onset of central CP. Pt reports pain has almost resolved since arrival. Pt also endorses upper back pain x several weeks.   -N/V, -dizziness, -SOB       EXTERNAL RECORDS REVIEWED  Outpatient Notes reviewed office visit progress note dated today by Dr. Tomlinson of the internal medicine service.  Patient seen for follow-up of lab results.  Endorses dizziness and began to have pain in the chest during the appointment.  Hypotensive as well with blood pressure between 67/34 and 89/52.  Relates no established history of coronary artery disease in the past.    HPI/ROS  LIMITATION TO HISTORY   Select: Language Irish,  Used   OUTSIDE HISTORIAN(S):  Family patient's daughter at bedside notes patient had unremarkable cardiac workup about 1 month ago and has no established coronary artery disease.    Hero Jamil is a 59 y.o. male who presents for evaluation of acute chest pain rating to the back.  Patient relates he has been doing well but today while en route to his doctor's office he began to have worsening discomfort in his upper back.  He notes no exertional component, this occurred when he was driving.  When he was at the doctor's office he relates the pain began to radiate to the lower chest/epigastrium.  Chest pain resolved, he does still have pain to the upper back but that is improved.  He notes he has had this on and off for the last several weeks.  Relates no radiation of pain to the extremities, no nausea, no vomiting.  Does endorse sensation of dizziness and feeling short of breath today again while he was at the doctor's office.  No swelling in the legs.  Currently chest pain-free.  No established history of coronary artery disease.    PAST MEDICAL HISTORY   Dyslipidemia    SURGICAL HISTORY  patient denies any surgical history    FAMILY HISTORY  No  "coronary artery disease in immediate family    SOCIAL HISTORY  Social History     Tobacco Use    Smoking status: Never    Smokeless tobacco: Never   Vaping Use    Vaping Use: Never used   Substance and Sexual Activity    Alcohol use: Never    Drug use: Never    Sexual activity: Yes     Partners: Female     Birth control/protection: None     Comment: Monagous relationship (wife)       CURRENT MEDICATIONS  Home Medications       Reviewed by Ivanna Nixon R.N. (Registered Nurse) on 04/25/24 at 1537  Med List Status: Not Addressed     Medication Last Dose Status   atorvastatin (LIPITOR) 10 MG Tab  Active   cyclobenzaprine (FLEXERIL) 10 mg Tab  Active   dutasteride (AVODART) 0.5 MG capsule  Active   melatonin 3 MG Tab  Active   omeprazole (PRILOSEC) 40 MG delayed-release capsule  Active   sucralfate (CARAFATE) 1 GM/10ML Suspension  Active   tamsulosin (FLOMAX) 0.4 MG capsule  Active                    ALLERGIES  No Known Allergies    PHYSICAL EXAM  VITAL SIGNS: /74   Pulse 80   Temp 36.9 °C (98.4 °F) (Temporal)   Resp 18   Ht 1.676 m (5' 6\")   Wt 62.3 kg (137 lb 5.6 oz)   SpO2 97%   BMI 22.17 kg/m²    General: Alert, no acute distress  Skin: Warm, dry, mildly pale, otherwise normal for ethnicity  Head: Normocephalic, atraumatic  Neck: Trachea midline, no tenderness  Eye: PERRL, normal conjunctiva  ENMT: Oral mucosa pink and mildly dry  Cardiovascular: Regular rate and rhythm, No murmur, Normal peripheral perfusion.  No peripheral edema.  Respiratory: Lungs CTA, respirations are non-labored, breath sounds are equal  Gastrointestinal: Soft, nontender, non distended  Musculoskeletal: No swelling, no deformity.  Patient localizes back discomfort to the mid upper thoracic region, no reproducible tenderness, no step-off.  Neurological: Alert and oriented to person, place, time, and situation  Lymphatics: No lymphadenopathy  Psychiatric: Cooperative, appropriate mood & affect     EKG/LABS  Results for orders " placed or performed during the hospital encounter of 04/25/24   CBC with Differential   Result Value Ref Range    WBC 9.6 4.8 - 10.8 K/uL    RBC 4.71 4.70 - 6.10 M/uL    Hemoglobin 14.9 14.0 - 18.0 g/dL    Hematocrit 43.5 42.0 - 52.0 %    MCV 92.4 81.4 - 97.8 fL    MCH 31.6 27.0 - 33.0 pg    MCHC 34.3 32.3 - 36.5 g/dL    RDW 42.3 35.9 - 50.0 fL    Platelet Count 163 (L) 164 - 446 K/uL    MPV 11.1 9.0 - 12.9 fL    Neutrophils-Polys 78.60 (H) 44.00 - 72.00 %    Lymphocytes 15.70 (L) 22.00 - 41.00 %    Monocytes 4.60 0.00 - 13.40 %    Eosinophils 0.30 0.00 - 6.90 %    Basophils 0.40 0.00 - 1.80 %    Immature Granulocytes 0.40 0.00 - 0.90 %    Nucleated RBC 0.00 0.00 - 0.20 /100 WBC    Neutrophils (Absolute) 7.56 (H) 1.82 - 7.42 K/uL    Lymphs (Absolute) 1.51 1.00 - 4.80 K/uL    Monos (Absolute) 0.44 0.00 - 0.85 K/uL    Eos (Absolute) 0.03 0.00 - 0.51 K/uL    Baso (Absolute) 0.04 0.00 - 0.12 K/uL    Immature Granulocytes (abs) 0.04 0.00 - 0.11 K/uL    NRBC (Absolute) 0.00 K/uL   Complete Metabolic Panel (CMP)   Result Value Ref Range    Sodium 142 135 - 145 mmol/L    Potassium 4.1 3.6 - 5.5 mmol/L    Chloride 109 96 - 112 mmol/L    Co2 22 20 - 33 mmol/L    Anion Gap 11.0 7.0 - 16.0    Glucose 107 (H) 65 - 99 mg/dL    Bun 13 8 - 22 mg/dL    Creatinine 0.75 0.50 - 1.40 mg/dL    Calcium 8.6 8.5 - 10.5 mg/dL    Correct Calcium 8.6 8.5 - 10.5 mg/dL    AST(SGOT) 76 (H) 12 - 45 U/L    ALT(SGPT) 40 2 - 50 U/L    Alkaline Phosphatase 65 30 - 99 U/L    Total Bilirubin 0.6 0.1 - 1.5 mg/dL    Albumin 4.0 3.2 - 4.9 g/dL    Total Protein 7.4 6.0 - 8.2 g/dL    Globulin 3.4 1.9 - 3.5 g/dL    A-G Ratio 1.2 g/dL   Troponins NOW   Result Value Ref Range    Troponin T <6 6 - 19 ng/L   Troponins in two (2) hours   Result Value Ref Range    Troponin T <6 6 - 19 ng/L   ESTIMATED GFR   Result Value Ref Range    GFR (CKD-EPI) 103 >60 mL/min/1.73 m 2   LIPASE   Result Value Ref Range    Lipase 40 11 - 82 U/L   EKG (NOW)   Result Value Ref Range     Report       [facility] of [facility] Emergency Dept.    Test Date:  2024  Pt Name:    INÉS JUAN   Department: ER  MRN:        [id number]                      Room:  Gender:     Male                         Technician: [id number]  :        1965                   Requested By:ER TRIAGE PROTOCOL  Order #:    [id number]                    Reading MD: HILLARY DELEON MD    Measurements  Intervals                                Axis  Rate:       66                           P:          0  AZ:         0                            QRS:        72  QRSD:       96                           T:          75  QT:         437  QTc:        458    Interpretive Statements  Atrial fibrillation  RSR' in V1 or V2, probably normal variant  Artifact in lead(s) I,II,III,aVL,V1,V2,V3,V4,V5,V6  No previous ECG available for comparison  Electronically Signed On 2024 17:03:53 PDT by HILLARY DELEON MD        I have independently interpreted this EKG    RADIOLOGY/PROCEDURES   I have independently interpreted the diagnostic imaging associated with this visit and am waiting the final reading from the radiologist.   My preliminary interpretation is as follows: No evidence of aortic dissection or aneurysm    Radiologist interpretation:  CT-CTA COMPLETE THORACOABDOMINAL AORTA   Final Result      1.  No evidence of thoracic aortic aneurysm or dissection.   2.  No evidence of abdominal aortic aneurysm or dissection.   3.  No pneumonia, pleural effusion or pneumothorax.   4.  Minimal atherosclerosis and LAD coronary artery calcification.   5.  Bilateral nonobstructive renal calculi.            DX-CHEST-PORTABLE (1 VIEW)   Final Result      1.  There is no acute cardiopulmonary process.          COURSE & MEDICAL DECISION MAKING    ASSESSMENT, COURSE AND PLAN  Care Narrative: Very pleasant 59-year-old gentleman presents for evaluation of acute chest pain today.  He has few cardiovascular risk factors  other than his age and history of dyslipidemia.  And pain radiating from the back to the chest and given associated elevated blood pressure reading here in the emergency department certainly vascular catastrophe such as aortic dissection must at least be considered.  Pain is not reproducible on the exam.  EKG and troponin are reassuring, delta troponin is 0.  CTA of chest is indicated as above, imaging demonstrates no evidence of aortic dissection or other life-threatening etiology of chest pain.  Chest Pain:  HEART Score 3  ED OBS: Yes; I am placing the patient in to an observation status due to a diagnostic uncertainty as well as therapeutic intensity. Patient placed in observation status at 5:04 PM, 4/25/2024.     Observation plan is as follows: Patient medicated with 1 L of normal saline.  Cardiac workup with 2-hour troponins and lipase will be obtained.  Have ordered CTA of the chest as well.  Differential diagnosis at this point includes but is not restricted to musculoskeletal pain, pancreatitis, aortic dissection, acute coronary syndrome, dehydration, electrolyte derangement    1951: Patient reassessed, remains chest pain-free.  Additional family at bedside, I have updated them with reassuring studies.  He is in no acute distress.    Upon Reevaluation, the patient's condition has: Improved; and will be discharged.    Patient discharged from ED Observation status at 1954 (Time) 4/25/24 (Date).   Hydration: Based on the patient's presentation of Dehydration the patient was given IV fluids. IV Hydration was used because oral hydration was not as rapid as required. Upon recheck following hydration, the patient was patient doing well, his skin tone is improving with IV fluids.      Patient Vitals for the past 24 hrs:   BP Temp Temp src Pulse Resp SpO2 Height Weight   04/25/24 1919 139/74 -- -- 80 18 97 % -- --   04/25/24 1758 137/60 36.9 °C (98.4 °F) Temporal 74 20 98 % -- --   04/25/24 1728 (!) 140/72 -- -- 81  "20 98 % -- --   04/25/24 1533 -- -- -- -- -- -- 1.676 m (5' 6\") 62.3 kg (137 lb 5.6 oz)   04/25/24 1529 (!) 147/78 37.1 °C (98.8 °F) Temporal 67 16 99 % -- --        ADDITIONAL PROBLEMS MANAGED  Chest pain, back pain, elevated blood pressure reading without diagnosis of hypertension, dehydration    DISPOSITION AND DISCUSSIONS  I have discussed management of the patient with the following physicians and AUBREY's:  NA    Discussion of management with other QHP or appropriate source(s): None     Escalation of care considered, and ultimately not performed:acute inpatient care management, however at this time, the patient is most appropriate for outpatient management    Barriers to care at this time, including but not limited to:  NA .     Decision tools and prescription drugs considered including, but not limited to:  Heart score is 3, low risk stratification .    The patient will return for new or worsening symptoms and is stable at the time of discharge.    Patient has had high blood pressure while in the emergency department, felt likely secondary to medical condition. Counseled patient to monitor blood pressure at home and follow up with primary care physician.      DISPOSITION:  Patient will be discharged home in stable condition.    FOLLOW UP:  Kia Tomlinson D.O.  [address] [facility] [id number] [phone]    Schedule an appointment as soon as possible for a visit         OUTPATIENT MEDICATIONS:  New Prescriptions    No medications on file          FINAL DIAGNOSIS  1. Chest pain, unspecified type    2. Acute midline thoracic back pain           Electronically signed by: Kevin Coker M.D., 4/25/2024 5:02 PM      "

## 2024-04-26 NOTE — ED NOTES
Bedside report received from off going RN/: Mario Alberto, assumed care of patient.  POC discussed with patient. Call light within reach, all needs addressed at this time.       Fall risk interventions in place: Not Applicable (all applicable per [place] Fall risk assessment)   Continuous monitoring: Cardiac Leads, Pulse Ox, or Blood Pressure  IVF/IV medications: Not Applicable   Oxygen: Room Air  Bedside sitter: Not Applicable   Isolation: Not Applicable

## 2024-05-13 ENCOUNTER — OFFICE VISIT (OUTPATIENT)
Dept: INTERNAL MEDICINE | Facility: OTHER | Age: 59
End: 2024-05-13
Payer: COMMERCIAL

## 2024-05-13 VITALS
HEART RATE: 89 BPM | OXYGEN SATURATION: 97 % | HEIGHT: 66 IN | DIASTOLIC BLOOD PRESSURE: 73 MMHG | BODY MASS INDEX: 22.02 KG/M2 | TEMPERATURE: 97.5 F | WEIGHT: 137 LBS | SYSTOLIC BLOOD PRESSURE: 124 MMHG

## 2024-05-13 DIAGNOSIS — G89.29 CHRONIC BILATERAL LOW BACK PAIN, UNSPECIFIED WHETHER SCIATICA PRESENT: ICD-10-CM

## 2024-05-13 DIAGNOSIS — R35.1 BPH ASSOCIATED WITH NOCTURIA: ICD-10-CM

## 2024-05-13 DIAGNOSIS — G47.9 DIFFICULTY SLEEPING: ICD-10-CM

## 2024-05-13 DIAGNOSIS — E78.5 DYSLIPIDEMIA: ICD-10-CM

## 2024-05-13 DIAGNOSIS — Z91.89 AT RISK FOR SLEEP APNEA: ICD-10-CM

## 2024-05-13 DIAGNOSIS — R42 DIZZINESS: ICD-10-CM

## 2024-05-13 DIAGNOSIS — G89.29 CHRONIC NECK AND BACK PAIN: ICD-10-CM

## 2024-05-13 DIAGNOSIS — N40.1 BPH ASSOCIATED WITH NOCTURIA: ICD-10-CM

## 2024-05-13 DIAGNOSIS — M54.50 CHRONIC BILATERAL LOW BACK PAIN, UNSPECIFIED WHETHER SCIATICA PRESENT: ICD-10-CM

## 2024-05-13 DIAGNOSIS — R07.9 CHEST PAIN, UNSPECIFIED TYPE: ICD-10-CM

## 2024-05-13 DIAGNOSIS — M54.9 CHRONIC NECK AND BACK PAIN: ICD-10-CM

## 2024-05-13 DIAGNOSIS — M54.2 CHRONIC NECK AND BACK PAIN: ICD-10-CM

## 2024-05-13 PROCEDURE — 3078F DIAST BP <80 MM HG: CPT | Performed by: INTERNAL MEDICINE

## 2024-05-13 PROCEDURE — 3074F SYST BP LT 130 MM HG: CPT | Performed by: INTERNAL MEDICINE

## 2024-05-13 PROCEDURE — 99214 OFFICE O/P EST MOD 30 MIN: CPT | Performed by: INTERNAL MEDICINE

## 2024-05-13 RX ORDER — TRAZODONE HYDROCHLORIDE 50 MG/1
25 TABLET ORAL NIGHTLY
Qty: 30 TABLET | Refills: 1 | Status: SHIPPED | OUTPATIENT
Start: 2024-05-13

## 2024-05-13 RX ORDER — TAMSULOSIN HYDROCHLORIDE 0.4 MG/1
0.8 CAPSULE ORAL DAILY
Qty: 90 CAPSULE | Refills: 3 | Status: SHIPPED | OUTPATIENT
Start: 2024-05-13

## 2024-05-13 RX ORDER — ATORVASTATIN CALCIUM 10 MG/1
10 TABLET, FILM COATED ORAL NIGHTLY
Qty: 60 TABLET | Refills: 3 | Status: SHIPPED | OUTPATIENT
Start: 2024-05-13

## 2024-05-13 ASSESSMENT — FIBROSIS 4 INDEX: FIB4 SCORE: 4.5

## 2024-05-13 ASSESSMENT — PAIN SCALES - GENERAL: PAINLEVEL: NO PAIN

## 2024-05-13 NOTE — PATIENT INSTRUCTIONS
Physical therapy---  [address] [facility] [address] [facility]  [address]  [place] [id number] [phone]
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
## 2024-05-14 ASSESSMENT — ENCOUNTER SYMPTOMS
WEAKNESS: 0
ORTHOPNEA: 0
NAUSEA: 0
VOMITING: 0
FEVER: 0
CHILLS: 0
CLAUDICATION: 0
BACK PAIN: 1
TINGLING: 0
EYE PAIN: 0
COUGH: 0
CONSTIPATION: 0
SORE THROAT: 0
NECK PAIN: 1
SHORTNESS OF BREATH: 0
INSOMNIA: 1
EYE REDNESS: 0
PALPITATIONS: 0
DIZZINESS: 1
HEADACHES: 1
DIARRHEA: 0
FALLS: 0
MYALGIAS: 0
ABDOMINAL PAIN: 0

## 2024-05-31 ENCOUNTER — TELEPHONE (OUTPATIENT)
Dept: INTERNAL MEDICINE | Facility: OTHER | Age: 59
End: 2024-05-31
Payer: COMMERCIAL

## 2024-05-31 NOTE — TELEPHONE ENCOUNTER
"Nilo Silverio,    I got a fax from [facility] regarding this patients colonoscopy summary.    Under \"gastroenterology fax [phone]\" in media.    Thank you  "

## 2024-06-11 ENCOUNTER — OFFICE VISIT (OUTPATIENT)
Dept: INTERNAL MEDICINE | Facility: OTHER | Age: 59
End: 2024-06-11
Payer: COMMERCIAL

## 2024-06-11 VITALS
OXYGEN SATURATION: 96 % | HEIGHT: 66 IN | WEIGHT: 134.8 LBS | HEART RATE: 80 BPM | TEMPERATURE: 97.2 F | BODY MASS INDEX: 21.66 KG/M2 | DIASTOLIC BLOOD PRESSURE: 74 MMHG | SYSTOLIC BLOOD PRESSURE: 120 MMHG

## 2024-06-11 DIAGNOSIS — G89.29 CHRONIC NECK PAIN: ICD-10-CM

## 2024-06-11 DIAGNOSIS — R07.9 CHEST PAIN, UNSPECIFIED TYPE: ICD-10-CM

## 2024-06-11 DIAGNOSIS — G89.29 CHRONIC BILATERAL THORACIC BACK PAIN: ICD-10-CM

## 2024-06-11 DIAGNOSIS — G89.29 CHRONIC BILATERAL LOW BACK PAIN, UNSPECIFIED WHETHER SCIATICA PRESENT: ICD-10-CM

## 2024-06-11 DIAGNOSIS — R42 DIZZINESS: ICD-10-CM

## 2024-06-11 DIAGNOSIS — M54.50 CHRONIC BILATERAL LOW BACK PAIN, UNSPECIFIED WHETHER SCIATICA PRESENT: ICD-10-CM

## 2024-06-11 DIAGNOSIS — M54.6 CHRONIC BILATERAL THORACIC BACK PAIN: ICD-10-CM

## 2024-06-11 DIAGNOSIS — M54.2 CHRONIC NECK PAIN: ICD-10-CM

## 2024-06-11 DIAGNOSIS — K21.9 GASTROESOPHAGEAL REFLUX DISEASE, UNSPECIFIED WHETHER ESOPHAGITIS PRESENT: ICD-10-CM

## 2024-06-11 DIAGNOSIS — G47.9 DIFFICULTY SLEEPING: ICD-10-CM

## 2024-06-11 PROCEDURE — 99214 OFFICE O/P EST MOD 30 MIN: CPT | Performed by: INTERNAL MEDICINE

## 2024-06-11 PROCEDURE — 3078F DIAST BP <80 MM HG: CPT | Performed by: INTERNAL MEDICINE

## 2024-06-11 PROCEDURE — 3074F SYST BP LT 130 MM HG: CPT | Performed by: INTERNAL MEDICINE

## 2024-06-11 RX ORDER — TRAZODONE HYDROCHLORIDE 50 MG/1
50 TABLET ORAL NIGHTLY
Qty: 30 TABLET | Refills: 1 | Status: SHIPPED | OUTPATIENT
Start: 2024-06-11

## 2024-06-11 RX ORDER — MELOXICAM 7.5 MG/1
7.5 TABLET ORAL DAILY
Qty: 30 TABLET | Refills: 1 | Status: SHIPPED | OUTPATIENT
Start: 2024-06-11

## 2024-06-11 RX ORDER — OMEPRAZOLE 40 MG/1
40 CAPSULE, DELAYED RELEASE ORAL DAILY
Qty: 90 CAPSULE | Refills: 1 | Status: SHIPPED | OUTPATIENT
Start: 2024-06-11

## 2024-06-11 ASSESSMENT — FIBROSIS 4 INDEX: FIB4 SCORE: 4.5

## 2024-06-11 ASSESSMENT — PAIN SCALES - GENERAL: PAINLEVEL: 2=MINIMAL-SLIGHT
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
## 2024-06-12 ASSESSMENT — ENCOUNTER SYMPTOMS
PALPITATIONS: 0
NECK PAIN: 1
CONSTIPATION: 0
DIZZINESS: 1
WEIGHT LOSS: 0
DEPRESSION: 0
NERVOUS/ANXIOUS: 0
NAUSEA: 0
DIARRHEA: 0
BLURRED VISION: 0
SHORTNESS OF BREATH: 0
WEAKNESS: 0
ORTHOPNEA: 0
ABDOMINAL PAIN: 0
COUGH: 0
SORE THROAT: 0
BACK PAIN: 1
DOUBLE VISION: 0
CHILLS: 0
FEVER: 0
MYALGIAS: 1
HEADACHES: 0
VOMITING: 0

## 2024-06-26 ENCOUNTER — ANCILLARY PROCEDURE (OUTPATIENT)
Dept: CARDIOLOGY | Facility: MEDICAL CENTER | Age: 59
End: 2024-06-26
Attending: INTERNAL MEDICINE
Payer: COMMERCIAL

## 2024-06-26 ENCOUNTER — TELEPHONE (OUTPATIENT)
Dept: INTERNAL MEDICINE | Facility: OTHER | Age: 59
End: 2024-06-26
Payer: COMMERCIAL

## 2024-06-26 ENCOUNTER — APPOINTMENT (OUTPATIENT)
Dept: RADIOLOGY | Facility: MEDICAL CENTER | Age: 59
End: 2024-06-26
Attending: INTERNAL MEDICINE
Payer: COMMERCIAL

## 2024-06-26 DIAGNOSIS — M54.9 CHRONIC NECK AND BACK PAIN: ICD-10-CM

## 2024-06-26 DIAGNOSIS — R07.9 CHEST PAIN, UNSPECIFIED TYPE: ICD-10-CM

## 2024-06-26 DIAGNOSIS — G89.29 CHRONIC NECK AND BACK PAIN: ICD-10-CM

## 2024-06-26 DIAGNOSIS — R42 DIZZINESS: ICD-10-CM

## 2024-06-26 DIAGNOSIS — M54.2 CHRONIC NECK AND BACK PAIN: ICD-10-CM

## 2024-06-26 LAB
LV EJECT FRACT  99904: 65
LV EJECT FRACT MOD 2C 99903: 63.29
LV EJECT FRACT MOD 4C 99902: 65.68
LV EJECT FRACT MOD BP 99901: 64.68

## 2024-06-26 PROCEDURE — 93306 TTE W/DOPPLER COMPLETE: CPT | Mod: 26 | Performed by: INTERNAL MEDICINE

## 2024-06-26 PROCEDURE — 72040 X-RAY EXAM NECK SPINE 2-3 VW: CPT

## 2024-06-26 PROCEDURE — 93306 TTE W/DOPPLER COMPLETE: CPT

## 2024-06-26 NOTE — TELEPHONE ENCOUNTER
----- Message from Shira Mckenna M.D. sent at 6/26/2024 11:26 AM PDT -----  Neck Xrays are normal. Pls let pt know.

## 2024-06-26 NOTE — TELEPHONE ENCOUNTER
Phone Number Called: [phone]    Call outcome: Spoke to patient regarding message below.    Message: Informed patient neck xray is normal. Patient voiced understanding.

## 2024-07-10 ENCOUNTER — TELEPHONE (OUTPATIENT)
Dept: HEALTH INFORMATION MANAGEMENT | Facility: OTHER | Age: 59
End: 2024-07-10
Payer: COMMERCIAL

## 2024-08-02 ENCOUNTER — PHYSICAL THERAPY (OUTPATIENT)
Dept: PHYSICAL THERAPY | Facility: REHABILITATION | Age: 59
End: 2024-08-02
Attending: INTERNAL MEDICINE
Payer: COMMERCIAL

## 2024-08-02 DIAGNOSIS — M54.50 CHRONIC BILATERAL LOW BACK PAIN, UNSPECIFIED WHETHER SCIATICA PRESENT: ICD-10-CM

## 2024-08-02 DIAGNOSIS — M54.9 CHRONIC NECK AND BACK PAIN: ICD-10-CM

## 2024-08-02 DIAGNOSIS — G89.29 CHRONIC BILATERAL LOW BACK PAIN, UNSPECIFIED WHETHER SCIATICA PRESENT: ICD-10-CM

## 2024-08-02 DIAGNOSIS — M54.2 CHRONIC NECK AND BACK PAIN: ICD-10-CM

## 2024-08-02 DIAGNOSIS — G89.29 CHRONIC NECK AND BACK PAIN: ICD-10-CM

## 2024-08-02 PROCEDURE — 97012 MECHANICAL TRACTION THERAPY: CPT

## 2024-08-02 PROCEDURE — 97161 PT EVAL LOW COMPLEX 20 MIN: CPT

## 2024-08-02 ASSESSMENT — ENCOUNTER SYMPTOMS
PAIN SCALE AT LOWEST: 1
PAIN SCALE AT HIGHEST: 8
PAIN SCALE: 5

## 2024-08-02 NOTE — OP THERAPY EVALUATION
Outpatient Physical Therapy  INITIAL EVALUATION    [facility] Physical Therapy [address]  [address]  [facility] [address]  Phone:  [phone]  Fax:  [phone]    Date of Evaluation: 2024    Patient: Hero Jamil  YOB: 1965  MRN: [id number]     Referring Provider: Kia Tomlinson D.O.  [address] [facility] [place] [address]   Referring Diagnosis Chronic neck and back pain [M54.2, M54.9, G89.29];Chronic bilateral low back pain, unspecified whether sciatica present [M54.50, G89.29]     Time Calculation  Start time: 0802  Stop time: 0900 Time Calculation (min): 58 minutes         Chief Complaint: No chief complaint on file.    Visit Diagnoses     ICD-10-CM   1. Chronic neck and back pain  M54.2    M54.9    G89.29   2. Chronic bilateral low back pain, unspecified whether sciatica present  M54.50    G89.29       Date of onset of impairment: 2023    Subjective   History of Present Illness:     History of chief complaint:  Patient reports chronic history of neck and back pain with some PT in the past w/ minimal relief.  Patient reports mopping and  vacuuming at work are the most aggravating activities.  R hand dominant    Prior level of function:      Pain:     Current pain ratin    At best pain ratin    At worst pain ratin    Location:  Neck pain , mid scap ache and pain into L palm--denied R u.e. pain or paresthesia either side    Aggravating factors:  Clean toilets w/o pain  Vacuum and mop w/o pain  Drive w/o pain  Sleep// up 2/night    \npdi: 30%  odi17          No past medical history on file.  No past surgical history on file.    Precautions:       Objective   Observation and functional movement:  Forward heade/scap--HOS 4 inches    Range of motion and strength:    R hand dominant   R: 65,63,60  L: 60,63,63    C/s r:75--no change  L;60--erp  Fle; to sternum--  Ext:40--stopped due to pain            Therapeutic Treatments and  "Modalities:     Therapeutic Treatment and Modalities Summary: MWMS contralateral and ipsilateral c4-7w/ L rot// 75 \" less pain  Mwms ctj with progression to self mwmws w9th pillow case--hep// 65 ext  Issued h/o and instructed mwms for both rotation and ext.  Reviewed importance of posture  Mechanical traction  720/10 x 60/20 x 15' w/ mhp     Time-based treatments/modalities:    Physical Therapy Timed Treatment Charges  Manual therapy minutes (CPT 74064): 10 minutes  Therapeutic exercise minutes (CPT 21966): 15 minutes      Assessment, Response and Plan:   Assessment details:  Patient presents with cervical spine mobility dysfunction with  poor posture awareness and significant forward head/scapular positioning.  Noted dominant upper trap holding pattern with  poor volitional control of low trap. Patient demonstrated increased AROM and decreased pain during the evaluation. Patient should respond well to treatment if compliant with POC.     Prognosis: good    Goals:   Short Term Goals:     Drive w/o pain  Sleep through night  NPDI: <25%  COLTON<12  Short term goal time span:  2-4 weeks      Long Term Goals:    Clean toilets w/o pain  Vacuum and mop w/o pain    NPDI: <10%  COLTON<6  Long term goal time span:  6-8 weeks    Plan:   Therapy options:  Physical therapy treatment to continue  Planned therapy interventions:  E Stim Unattended (CPT 11749), Mechanical Traction (CPT 67603), Therapeutic Exercise (CPT 89181) and Manual Therapy (CPT 35563)  Other planned therapy interventions:  Dry needle  Frequency:  2x week  Duration in weeks:  8  Duration in visits:  16  Plan details:  Mulligan progression, posture ed, cupping, joint mobs, IASTM, e-stim, DN, traction       Functional Assessment Used        Referring provider co-signature:  I have reviewed this plan of care and my co-signature certifies the need for services.    Certification Period: 08/02/2024 to  10/04/24    Physician Signature: ________________________________ Date: " ______________

## 2024-08-16 ENCOUNTER — PHYSICAL THERAPY (OUTPATIENT)
Dept: PHYSICAL THERAPY | Facility: REHABILITATION | Age: 59
End: 2024-08-16
Attending: INTERNAL MEDICINE
Payer: COMMERCIAL

## 2024-08-16 DIAGNOSIS — G89.29 CHRONIC NECK AND BACK PAIN: ICD-10-CM

## 2024-08-16 DIAGNOSIS — M54.2 CHRONIC NECK AND BACK PAIN: ICD-10-CM

## 2024-08-16 DIAGNOSIS — M54.9 CHRONIC NECK AND BACK PAIN: ICD-10-CM

## 2024-08-16 PROCEDURE — 97140 MANUAL THERAPY 1/> REGIONS: CPT

## 2024-08-16 PROCEDURE — 97110 THERAPEUTIC EXERCISES: CPT

## 2024-08-16 PROCEDURE — 97012 MECHANICAL TRACTION THERAPY: CPT

## 2024-08-16 NOTE — OP THERAPY DAILY TREATMENT
"  Outpatient Physical Therapy  DAILY TREATMENT     [facility] Physical [address].  [address]  [place] [address]  Phone:  [phone]  Fax:  [phone]    Date: 08/16/2024    Patient: Hero Jamil  YOB: 1965  MRN: [id number]     Time Calculation    Start time: 0802  Stop time: 0859 Time Calculation (min): 57 minutes         Chief Complaint: No chief complaint on file.    Visit #: 2    SUBJECTIVE:  More movement  but still pain looking up    OBJECTIVE:  Ext 55--erp  R: 65--\"Tight\" L scm  L:60--tight L scm          Therapeutic Treatments and Modalities:     Therapeutic Treatment and Modalities Summary: Chin tuck head lift 35\"// \"no pain to r\" 65  MWMS contralateral and ipsilateral c4-7w/ L rot// 75 \" no pain  Mwms ctj with progression to self mwmws w9th pillow case--hep// 65 ext  Towel assisted RR with distraction upon c/s extension w/ pillow case x 10  Issued h/o and instructed mwms for both rotation and ext.  Supine active levator/trap stretch in supine--hep  Reviewed importance of posture--emphasis on focus at work  ELA tall wall finger slide  HEP Access Code: [email or web address]  Mechanical traction  720/10 x 60/20 x 15' w/ mhp       Time-based treatments/modalities:    Physical Therapy Timed Treatment Charges  Manual therapy minutes (CPT 05410): 10 minutes  Therapeutic exercise minutes (CPT 20047): 30 minutes      Pain rating (1-10) before treatment:  3  Pain rating (1-10) after treatment:  0  Ext 70 no pain  R: 75 no pain L: 70 \" slightly tight\"  ASSESSMENT:   Cont. Fair- posture but consistent Improvement with MWMs with limited deep flexor muscle endurance    PLAN/RECOMMENDATIONS:   GEORGE Castle angel progression,          "

## 2024-08-23 ENCOUNTER — PHYSICAL THERAPY (OUTPATIENT)
Dept: PHYSICAL THERAPY | Facility: REHABILITATION | Age: 59
End: 2024-08-23
Attending: INTERNAL MEDICINE
Payer: COMMERCIAL

## 2024-08-23 DIAGNOSIS — M54.9 CHRONIC NECK AND BACK PAIN: ICD-10-CM

## 2024-08-23 DIAGNOSIS — M54.2 CHRONIC NECK AND BACK PAIN: ICD-10-CM

## 2024-08-23 DIAGNOSIS — G89.29 CHRONIC NECK AND BACK PAIN: ICD-10-CM

## 2024-08-23 PROCEDURE — 97140 MANUAL THERAPY 1/> REGIONS: CPT

## 2024-08-23 PROCEDURE — 97014 ELECTRIC STIMULATION THERAPY: CPT

## 2024-08-23 PROCEDURE — 97110 THERAPEUTIC EXERCISES: CPT

## 2024-08-23 NOTE — OP THERAPY DAILY TREATMENT
"  Outpatient Physical Therapy  DAILY TREATMENT     [facility] Physical [address]  [address]  [place] [address]  Phone:  [phone]  Fax:  [phone]    Date: 08/23/2024    Patient: Hero Jamil  YOB: 1965  MRN: [id number]     Time Calculation    Start time: 0935  Stop time: 1030 Time Calculation (min): 55 minutes         Chief Complaint: No chief complaint on file.    Visit #: 3    SUBJECTIVE:  Less pain past week but still pain with mopping and vacuuming     OBJECTIVE:  Ext 65--erp  R: 703-\"Tight\" L  L:70-tight L neck          Therapeutic Treatments and Modalities:     Therapeutic Treatment and Modalities Summary: Chin tuck head lift 45 // \"no pain to r\" 65  MWMS contralateral and ipsilateral c4-7w/ L rot// 75 \" no pain--less pain more motion with ipsilateral  Mwms self mwmws w9th pillow case-reviewed hand position//  Towel assisted RR with distraction upon c/s extension w/ pillow case x 10  Segmental stm with distraction w/ bilateral rotation  Supine rotation with self o/p and chin tuck with x2 deep breaths  Chin tuck head lift x 30\"  then, supine rotation with chin tuck x 2    Reviewed importance of posture--emphasis on focus at work  ELDOA tall wall finger slide  Reil with PT o/p // centralized  Russian 5/5 bilateral c/s mulitfidi and l/s mulifidi in seil x 15'      Time-based treatments/modalities:    Physical Therapy Timed Treatment Charges  Manual therapy minutes (CPT 11028): 20 minutes  Therapeutic exercise minutes (CPT 77981): 20 minutes      Pain rating (1-10) before treatment:  5/10 tight\"  Pain rating (1-10) after treatment:  \" much better  Ext 75   R: 75 no pain L: 75 \" slightly tight\"  ASSESSMENT:   Cont. Fair- posture but consistent Improvement with MWMs with limited deep flexor muscle endurance.  Reported back strain from work yesterday and centralized with libia protocol    PLAN/RECOMMENDATIONS:   Mwms, GEORGE, terese progression,          "

## 2024-08-30 ENCOUNTER — PHYSICAL THERAPY (OUTPATIENT)
Dept: PHYSICAL THERAPY | Facility: REHABILITATION | Age: 59
End: 2024-08-30
Attending: INTERNAL MEDICINE
Payer: COMMERCIAL

## 2024-08-30 DIAGNOSIS — G89.29 CHRONIC NECK AND BACK PAIN: ICD-10-CM

## 2024-08-30 DIAGNOSIS — M54.9 CHRONIC NECK AND BACK PAIN: ICD-10-CM

## 2024-08-30 DIAGNOSIS — M54.2 CHRONIC NECK AND BACK PAIN: ICD-10-CM

## 2024-08-30 PROCEDURE — 97140 MANUAL THERAPY 1/> REGIONS: CPT

## 2024-08-30 PROCEDURE — 97110 THERAPEUTIC EXERCISES: CPT

## 2024-08-30 PROCEDURE — 97014 ELECTRIC STIMULATION THERAPY: CPT

## 2024-08-30 NOTE — OP THERAPY DAILY TREATMENT
"  Outpatient Physical Therapy  DAILY TREATMENT     [facility] Physical Therapy [address]  [address]  [place] [address]  Phone:  [phone]  Fax:  [phone]    Date: 08/30/2024    Patient: Hero Jamil  YOB: 1965  MRN: [id number]     Time Calculation    Start time: 0935  Stop time: 1030 Time Calculation (min): 55 minutes         Chief Complaint: No chief complaint on file.    Visit #: 4    SUBJECTIVE:  Less pain past week but still slight pain with mopping and vacuuming     OBJECTIVE:  Ext 60--erp  R: 70-\"Tight\" L  L:60-tight L neck--erp          Therapeutic Treatments and Modalities:     Therapeutic Treatment and Modalities Summary: Scap retraction er#2 5 x 30\"  MWMS contralateral and ipsilateral c4-7w/ L rot// 75 \" no pain--less pain more motion with ipsilateral  Mwms self mwmws with pillow case-reviewed hand position/--reviewed  Supine rotation with self o/p and chin tuck with x2 deep [id number]  Segmental stm with distraction w/ bilateral rotation  Cupping L upper trap and multifidi mid c/s    Reviewed importance of posture--emphasis on focus at work    Czech 5/5 bilateral c/s mulitfidi and l/s mulifidi in seil x 15'      Time-based treatments/modalities:    Physical Therapy Timed Treatment Charges  Manual therapy minutes (CPT 56156): 15 minutes  Therapeutic exercise minutes (CPT 73981): 25 minutes      Pain rating (1-10) before treatment:  5/10 tight\"  Pain rating (1-10) after treatment:  \" much better  Ext 75   R: 75 no pain L: 75 \" slightly tight\"  ASSESSMENT:   Cont. Fair- posture w/ cont upper trap dominant holding pattern L >R     PLAN/RECOMMENDATIONS:   Mwms, terese VAZQUEZ,          "

## 2024-09-06 ENCOUNTER — PHYSICAL THERAPY (OUTPATIENT)
Dept: PHYSICAL THERAPY | Facility: REHABILITATION | Age: 59
End: 2024-09-06
Attending: INTERNAL MEDICINE
Payer: COMMERCIAL

## 2024-09-06 DIAGNOSIS — G89.29 CHRONIC NECK AND BACK PAIN: ICD-10-CM

## 2024-09-06 DIAGNOSIS — M54.9 CHRONIC NECK AND BACK PAIN: ICD-10-CM

## 2024-09-06 DIAGNOSIS — M54.2 CHRONIC NECK AND BACK PAIN: ICD-10-CM

## 2024-09-06 PROCEDURE — 97110 THERAPEUTIC EXERCISES: CPT

## 2024-09-06 PROCEDURE — 97012 MECHANICAL TRACTION THERAPY: CPT

## 2024-09-06 NOTE — OP THERAPY DAILY TREATMENT
"  Outpatient Physical Therapy  DAILY TREATMENT     [facility] Physical Therapy [address]  [address]  [place] [address]  Phone:  [phone]  Fax:  [phone]    Date: 09/06/2024    Patient: Hero Jamil  YOB: 1965  MRN: [id number]     Time Calculation    Start time: 0930  Stop time: 1015 Time Calculation (min): 45 minutes         Chief Complaint: No chief complaint on file.    Visit #: 5    SUBJECTIVE:  No pain with vacuuming at work but stiff/tight at end of day    OBJECTIVE:  Ext 60--erp  R: 70-\"Tight\" L  L:60-tight L neck--erp          Therapeutic Treatments and Modalities:     Therapeutic Treatment and Modalities Summary: Reviewed ELDOA wall progression ex.  Wall terese --hep  MWms --self snag for lower c/s  Reviewed importance of posture--emphasis on focus at work  Mechanical traction  18/10 x 60/20 x 15' w/ mhp           Time-based treatments/modalities:    Physical Therapy Timed Treatment Charges  Therapeutic exercise minutes (CPT 76293): 30 minutes      Pain rating (1-10) before treatment:  1/10 tight\"  Pain rating (1-10) after treatment:  \" much better  Ext 75   R: 75 no pain L: 75   ASSESSMENT:   Cont. Fair- posture w/ cont upper trap dominant holding pattern L >R w/ patient forgetting to perfrom WALL ELDOA and terese progression ex.  Increased rom and decreased c/o tightness after treatment    PLAN/RECOMMENDATIONS:   Mwms, ELA, terese progression, d/c 1-2 visits         "

## 2024-09-13 ENCOUNTER — PHYSICAL THERAPY (OUTPATIENT)
Dept: PHYSICAL THERAPY | Facility: REHABILITATION | Age: 59
End: 2024-09-13
Attending: INTERNAL MEDICINE
Payer: COMMERCIAL

## 2024-09-13 DIAGNOSIS — M54.9 CHRONIC NECK AND BACK PAIN: ICD-10-CM

## 2024-09-13 DIAGNOSIS — G89.29 CHRONIC NECK AND BACK PAIN: ICD-10-CM

## 2024-09-13 DIAGNOSIS — M54.2 CHRONIC NECK AND BACK PAIN: ICD-10-CM

## 2024-09-13 PROCEDURE — 97110 THERAPEUTIC EXERCISES: CPT

## 2024-09-13 PROCEDURE — 97140 MANUAL THERAPY 1/> REGIONS: CPT

## 2024-09-13 PROCEDURE — 97014 ELECTRIC STIMULATION THERAPY: CPT

## 2024-09-13 NOTE — OP THERAPY DAILY TREATMENT
"  Outpatient Physical Therapy  DAILY TREATMENT     [facility] Physical Therapy [address]  [address]  [place] [address]  Phone:  [phone]  Fax:  [phone]    Date: 09/13/2024    Patient: Hero Jamil  YOB: 1965  MRN: [id number]     Time Calculation                   Chief Complaint: No chief complaint on file.    Visit #: 6    SUBJECTIVE:  Denies pain at home with adls but still reports pain with mopping and vacuuming for longer periods    OBJECTIVE:  Ext 60--erp  R: 70-\"Tight\" L  L:55tight L neck--erp          Therapeutic Treatments and Modalities:     Therapeutic Treatment and Modalities Summary: Superman x 2'  Ball walk outs 20\" x 3  Reviewed ELDOA wall progression ex.  Wall terese --hep  MWms --self snag for lower c/s  Reviewed home self MWMs   posture--emphasis on focus at work    E-stim to nemc and l/s 5/5/ mhp x 15              Time-based treatments/modalities:           Pain rating (1-10) before treatment:  1/10 tight\"  Pain rating (1-10) after treatment:  \" much better  Ext 75   R: 75 no pain L: 75   ASSESSMENT:   Improving strength and decreased pain overall but cont. To report limit with longer duration mopping and vacuuming.  Noted limited core endurance and strength    PLAN/RECOMMENDATIONS:   Mwms, ELDOA, terese progression, core stab progression  d/c 1-2 visits         "

## 2024-09-20 ENCOUNTER — OFFICE VISIT (OUTPATIENT)
Dept: INTERNAL MEDICINE | Facility: OTHER | Age: 59
End: 2024-09-20
Payer: COMMERCIAL

## 2024-09-20 ENCOUNTER — PHYSICAL THERAPY (OUTPATIENT)
Dept: PHYSICAL THERAPY | Facility: REHABILITATION | Age: 59
End: 2024-09-20
Attending: INTERNAL MEDICINE
Payer: COMMERCIAL

## 2024-09-20 VITALS
HEART RATE: 82 BPM | WEIGHT: 138 LBS | TEMPERATURE: 98.4 F | DIASTOLIC BLOOD PRESSURE: 80 MMHG | BODY MASS INDEX: 22.27 KG/M2 | OXYGEN SATURATION: 97 % | SYSTOLIC BLOOD PRESSURE: 112 MMHG

## 2024-09-20 DIAGNOSIS — M54.2 CHRONIC NECK AND BACK PAIN: ICD-10-CM

## 2024-09-20 DIAGNOSIS — R35.1 BPH ASSOCIATED WITH NOCTURIA: ICD-10-CM

## 2024-09-20 DIAGNOSIS — E78.5 DYSLIPIDEMIA: ICD-10-CM

## 2024-09-20 DIAGNOSIS — M54.9 CHRONIC NECK AND BACK PAIN: ICD-10-CM

## 2024-09-20 DIAGNOSIS — G47.9 DIFFICULTY SLEEPING: ICD-10-CM

## 2024-09-20 DIAGNOSIS — Z23 NEED FOR VACCINATION: ICD-10-CM

## 2024-09-20 DIAGNOSIS — G89.29 CHRONIC NECK AND BACK PAIN: ICD-10-CM

## 2024-09-20 DIAGNOSIS — N40.1 BPH ASSOCIATED WITH NOCTURIA: ICD-10-CM

## 2024-09-20 PROCEDURE — 90471 IMMUNIZATION ADMIN: CPT | Performed by: INTERNAL MEDICINE

## 2024-09-20 PROCEDURE — 97110 THERAPEUTIC EXERCISES: CPT

## 2024-09-20 PROCEDURE — 90656 IIV3 VACC NO PRSV 0.5 ML IM: CPT | Performed by: INTERNAL MEDICINE

## 2024-09-20 PROCEDURE — 99214 OFFICE O/P EST MOD 30 MIN: CPT | Mod: 25 | Performed by: INTERNAL MEDICINE

## 2024-09-20 RX ORDER — DUTASTERIDE 0.5 MG/1
0.5 CAPSULE, LIQUID FILLED ORAL DAILY
Qty: 90 CAPSULE | Refills: 3 | Status: SHIPPED | OUTPATIENT
Start: 2024-09-20

## 2024-09-20 RX ORDER — TAMSULOSIN HYDROCHLORIDE 0.4 MG/1
1 CAPSULE ORAL
COMMUNITY
End: 2024-09-20

## 2024-09-20 RX ORDER — ATORVASTATIN CALCIUM 10 MG/1
10 TABLET, FILM COATED ORAL NIGHTLY
Qty: 90 TABLET | Refills: 3 | Status: SHIPPED | OUTPATIENT
Start: 2024-09-20

## 2024-09-20 RX ORDER — DUTASTERIDE 0.5 MG/1
1 CAPSULE, LIQUID FILLED ORAL
COMMUNITY
End: 2024-09-20

## 2024-09-20 RX ORDER — ATORVASTATIN CALCIUM 10 MG/1
1 TABLET, FILM COATED ORAL EVERY EVENING
COMMUNITY
End: 2024-09-20

## 2024-09-20 RX ORDER — HYDROCORTISONE 2.5 %
1 CREAM (GRAM) TOPICAL 2 TIMES DAILY
COMMUNITY

## 2024-09-20 RX ORDER — TRAZODONE HYDROCHLORIDE 50 MG/1
75 TABLET, FILM COATED ORAL NIGHTLY
Qty: 60 TABLET | Refills: 5 | Status: SHIPPED | OUTPATIENT
Start: 2024-09-20

## 2024-09-20 RX ORDER — TAMSULOSIN HYDROCHLORIDE 0.4 MG/1
0.8 CAPSULE ORAL DAILY
Qty: 180 CAPSULE | Refills: 3 | Status: SHIPPED | OUTPATIENT
Start: 2024-09-20

## 2024-09-20 ASSESSMENT — ENCOUNTER SYMPTOMS
HEADACHES: 0
ABDOMINAL PAIN: 0
HEARTBURN: 0
PALPITATIONS: 0
NAUSEA: 0
NERVOUS/ANXIOUS: 0
CHILLS: 0
DEPRESSION: 0
SHORTNESS OF BREATH: 0
SORE THROAT: 0
WEIGHT LOSS: 0
INSOMNIA: 1
MYALGIAS: 0
BLURRED VISION: 0
COUGH: 0
DIARRHEA: 0
DIZZINESS: 0
WEAKNESS: 0
DOUBLE VISION: 0
VOMITING: 0
FEVER: 0

## 2024-09-20 ASSESSMENT — FIBROSIS 4 INDEX: FIB4 SCORE: 4.5

## 2024-09-20 NOTE — OP THERAPY DAILY TREATMENT
"  Outpatient Physical Therapy  DAILY TREATMENT     [facility] Physical Therapy [address]  [address]  [place] [address]  Phone:  [phone]  Fax:  [phone]    Date: 09/20/2024    Patient: Hero Jamil  YOB: 1965  MRN: [id number]     Time Calculation    Start time: 0938  Stop time: 1000 Time Calculation (min): 22 minutes         Chief Complaint: No chief complaint on file.    Visit #: 7    SUBJECTIVE:  Patient reports that he feels pretty good and does not think he needs therapy anymore    OBJECTIVE:  Ext 65--  R: 75-\"Tight\" L 70            Therapeutic Treatments and Modalities:     Therapeutic Treatment and Modalities Summary: Reviewed HEP               Time-based treatments/modalities:    Physical Therapy Timed Treatment Charges  Therapeutic exercise minutes (CPT 65149): 10 minutes      Pain rating (1-10) before treatment:  1/10 tight\"  Pain rating (1-10) after treatment:  \" much better  Ext 75   R: 75 no pain L: 75   ASSESSMENT:   Patient is independent with his HEP   [id number]  PLAN/RECOMMENDATIONS:   D/c to an independent hep         "

## 2024-09-20 NOTE — PROGRESS NOTES
9/20/2024  Chief Complaint   Patient presents with    Neck Pain     Finished with PT        HISTORY OF PRESENT ILLNESS: Patient is a 59 y.o. male established patient who presents today for follow-up.   An interpretor was used today, Jon Gardiner.  Patient reports that overall he is doing well, no acute concerns or complaints.    Chronic neck and back pain  His pain has improved with therapy, he is no longer requiring any medications. Has stopped Meloxicam and Flexeril. He continues to work as a  and pain is better after work.   There was concern for possible cardiac etiology, at prior office visit patient patient complained of thoracic and chest pain. Three BP readings with hypotension systolic readings in the 60-80s. He was seen in the ER with normal troponin and CTA without evidence of dissection  Patient was schedule for a stress test 6/28, however, had difficulty finding the location and missed his appointment. He did not reschedule. He denies any further chest pain, shortness of breath, N/V, dizziness.    Dyslipidemia  He is doing well on Lipitor, denies any side effects.    Sleeping difficulty  He continues to report difficulty with sleep maintenance, reports good and bad days. Has associated fatigue, has a sleep study schedule for 12/2024. He tried Trazodone 50mg nightly without minimal improvement.    Patient Active Problem List    Diagnosis Date Noted    Chronic neck and back pain 09/20/2024    Sleeping difficulty 09/20/2024    Kidney stones 05/01/2023    Dyslipidemia 05/01/2023    Benign prostatic hyperplasia with nocturia 05/20/2022       Allergies:Patient has no known allergies.    Current Outpatient Medications   Medication Sig Dispense Refill    traZODone (DESYREL) 50 MG Tab Take 1.5 Tablets by mouth every evening. If ineffective, may increase to 2 tablets by mouth every evening. 60 Tablet 5    tamsulosin (FLOMAX) 0.4 MG capsule Take 2 Capsules by mouth every day. 180 Capsule 3    dutasteride  (AVODART) 0.5 MG capsule Take 1 Capsule by mouth every day. 90 Capsule 3    atorvastatin (LIPITOR) 10 MG Tab Take 1 Tablet by mouth every evening. 90 Tablet 3    omeprazole (PRILOSEC) 40 MG delayed-release capsule Take 1 Capsule by mouth every day. 90 Capsule 1    hydrocortisone 2.5 % Cream topical cream Apply 1 Application topically 2 times a day. (Patient not taking: Reported on 9/20/2024)       No current facility-administered medications for this visit.       Social History     Tobacco Use    Smoking status: Never    Smokeless tobacco: Never   Vaping Use    Vaping status: Never Used   Substance Use Topics    Alcohol use: Never    Drug use: Never       No family history on file.      Review of Systems:   Review of Systems   Constitutional:  Positive for malaise/fatigue. Negative for chills, fever and weight loss.   HENT:  Negative for congestion, hearing loss, sore throat and tinnitus.    Eyes:  Negative for blurred vision and double vision.   Respiratory:  Negative for cough and shortness of breath.    Cardiovascular:  Negative for chest pain, palpitations and leg swelling.   Gastrointestinal:  Negative for abdominal pain, diarrhea, heartburn, nausea and vomiting.   Musculoskeletal:  Negative for myalgias.   Neurological:  Negative for dizziness, weakness and headaches.   Psychiatric/Behavioral:  Negative for depression. The patient has insomnia. The patient is not nervous/anxious.        Exam:  /80 (BP Location: Left arm, Patient Position: Sitting, BP Cuff Size: Adult)   Pulse 82   Temp 36.9 °C (98.4 °F)   Wt 62.6 kg (138 lb)   SpO2 97%  Body mass index is 22.27 kg/m².  Physical Exam  Constitutional:       General: He is not in acute distress.     Appearance: He is normal weight. He is not toxic-appearing.   HENT:      Head: Normocephalic and atraumatic.      Right Ear: Tympanic membrane, ear canal and external ear normal.      Left Ear: Tympanic membrane, ear canal and external ear normal.      Nose:  Nose normal.      Mouth/Throat:      Pharynx: Oropharynx is clear.   Eyes:      Extraocular Movements: Extraocular movements intact.      Conjunctiva/sclera: Conjunctivae normal.   Cardiovascular:      Rate and Rhythm: Normal rate and regular rhythm.      Pulses: Normal pulses.   Pulmonary:      Effort: Pulmonary effort is normal.      Breath sounds: Normal breath sounds.   Musculoskeletal:         General: Normal range of motion.      Cervical back: Neck supple.      Right lower leg: No edema.      Left lower leg: No edema.   Lymphadenopathy:      Cervical: No cervical adenopathy.   Skin:     General: Skin is warm and dry.   Neurological:      General: No focal deficit present.      Mental Status: He is alert.      Gait: Gait normal.   Psychiatric:         Mood and Affect: Mood normal.         Behavior: Behavior normal.         Thought Content: Thought content normal.         Judgment: Judgment normal.         Assessment/Plan:     Chronic neck and back pain  Improved with physical therapy, he is no longer requiring any oral analgesics and muscle relaxant.   Continue home exercises and stretches  Ibuprofen/Tylenol as needed.    Dyslipidemia  Improved, LDL 71  Continue Lipitor   - atorvastatin (LIPITOR) 10 MG Tab; Take 1 Tablet by mouth every evening.  Dispense: 90 Tablet; Refill: 3    Sleeping difficulty  Continue to endorse fatigue  Has sleep study schedule 12/2024  Will increase Trazodone to 100mg night, side effect profile discussed with patient.  - traZODone (DESYREL) 50 MG Tab; Take 1.5 Tablets by mouth every evening. If ineffective, may increase to 2 tablets by mouth every evening.  Dispense: 60 Tablet; Refill: 5    BPH  Chronic, stable.  Continue Flomax and Proscar  - tamsulosin (FLOMAX) 0.4 MG capsule; Take 2 Capsules by mouth every day.  Dispense: 180 Capsule; Refill: 3  - dutasteride (AVODART) 0.5 MG capsule; Take 1 Capsule by mouth every day.  Dispense: 90 Capsule; Refill: 3    Need for vaccination  -  INFLUENZA VACCINE QUAD INJ (PF)      All imaging results and lab results and consult notes are reviewed at this visit.  Followup: Return in about 3 months (around 12/20/2024).

## 2024-09-20 NOTE — PATIENT INSTRUCTIONS
Please call and reschedule stress test (for you heart)  Continue medications as prescribed.  Increase Trazodone to 75mg (1.5 tablets) nightly, if it does not help may increase to 100mg (2 tablets) nightly.

## 2024-09-20 NOTE — OP THERAPY DISCHARGE SUMMARY
"  Outpatient Physical Therapy  DISCHARGE SUMMARY NOTE      [facility] [address]  [address] [place] [address]  Phone:  [phone]  Fax:  [phone]    Date of Visit: 09/20/2024    Patient: Hero Jamil  YOB: 1965  MRN: [id number]     Referring Provider: Kia Tomlinson D.O.  [address] [place] [address]   Referring Diagnosis Low back pain, unspecified [M54.50];Cervicalgia [M54.2];Dorsalgia, unspecified [M54.9];Other chronic pain [G89.29]         Functional Assessment Used  NPDI: 10        Your patient is being discharged from Physical Therapy with the following comments:   Goals met    SUBJECTIVE:  Patient reports that he feels pretty good and does not think he needs therapy anymore    OBJECTIVE:  Ext 65--  R: 75-\"Tight\" L 70    Ext 75   R: 75 no pain L: 75   ASSESSMENT:   Patient is independent with his HEP     PLAN/RECOMMENDATIONS:   D/c to an independent hep    Jon Bishop, PT, DPT, OCS    Date: 9/20/2024         "

## 2024-11-21 ENCOUNTER — OFFICE VISIT (OUTPATIENT)
Dept: INTERNAL MEDICINE | Facility: OTHER | Age: 59
End: 2024-11-21
Payer: COMMERCIAL

## 2024-11-21 VITALS
BODY MASS INDEX: 23.43 KG/M2 | SYSTOLIC BLOOD PRESSURE: 125 MMHG | TEMPERATURE: 97 F | WEIGHT: 145.8 LBS | DIASTOLIC BLOOD PRESSURE: 78 MMHG | HEART RATE: 83 BPM | OXYGEN SATURATION: 99 % | HEIGHT: 66 IN

## 2024-11-21 DIAGNOSIS — G47.00 INSOMNIA, UNSPECIFIED TYPE: ICD-10-CM

## 2024-11-21 DIAGNOSIS — Z12.11 COLON CANCER SCREENING: ICD-10-CM

## 2024-11-21 DIAGNOSIS — K64.4 EXTERNAL HEMORRHOIDS WITHOUT COMPLICATION: ICD-10-CM

## 2024-11-21 DIAGNOSIS — M54.50 ACUTE BILATERAL LOW BACK PAIN WITHOUT SCIATICA: ICD-10-CM

## 2024-11-21 DIAGNOSIS — M62.830 MUSCLE SPASM OF BACK: ICD-10-CM

## 2024-11-21 PROCEDURE — 3074F SYST BP LT 130 MM HG: CPT | Performed by: INTERNAL MEDICINE

## 2024-11-21 PROCEDURE — 99214 OFFICE O/P EST MOD 30 MIN: CPT | Performed by: INTERNAL MEDICINE

## 2024-11-21 PROCEDURE — 3078F DIAST BP <80 MM HG: CPT | Performed by: INTERNAL MEDICINE

## 2024-11-21 ASSESSMENT — FIBROSIS 4 INDEX: FIB4 SCORE: 4.5

## 2024-11-21 NOTE — PROGRESS NOTES
11/21/2024  Chief Complaint   Patient presents with    Follow-Up       HISTORY OF PRESENT ILLNESS: Patient is a 59 y.o. male established patient who presents today for follow-up    He c/o 3 week history of lower back pain. 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 Pain is worse with sitting, twisting, and bending- described as tightness. It is localized to the lower back, he denies any radiation to legs, numbness/tingling, weakness, bladder/bowel incontinence or saddle anesthesia. He has tried Tylenol 1000mg once daily or Ibuprofen 400mg one daily as needed, which has helped some. Pain is worse on the right.   She does report a prior history of lower back pain, had an MRI in 2006 which demonstrated minimal diffuse disc bulge at L4-L5 with a tiny posterior central protrusion.  Very minimal disc bulge with degenerative endplate changes at L1-L2.  No areas of central stenosis or neural foraminal narrowing    Patient also reports rectal pain that started around the same time.  History of hemorrhoids.  Is any itching, but does report very minimal blood with wiping.  Has occasional constipation.  Reports pain is worse after defecation.  Not spend excessive time on the toilet.  He has tried Preparation H over-the-counter in the past but has not during this occurrence.    With his increased dose of trazodone, patient reports that he is sleeping much better.  Has not noted any side effects.      Patient Active Problem List    Diagnosis Date Noted    Chronic neck and back pain 09/20/2024    Sleeping difficulty 09/20/2024    Kidney stones 05/01/2023    Dyslipidemia 05/01/2023    Benign prostatic hyperplasia with nocturia 05/20/2022       Allergies:Patient has no known allergies.    Current Outpatient Medications   Medication Sig Dispense Refill    tizanidine (ZANAFLEX) 4 MG Tab Take 1 Tablet by mouth every 12 hours as needed (muscle spasm). 14 Tablet 0     "hydrocortisone 2.5 % Cream topical cream Apply 1 Application topically 2 times a day.      traZODone (DESYREL) 50 MG Tab Take 1.5 Tablets by mouth every evening. If ineffective, may increase to 2 tablets by mouth every evening. 60 Tablet 5    tamsulosin (FLOMAX) 0.4 MG capsule Take 2 Capsules by mouth every day. 180 Capsule 3    dutasteride (AVODART) 0.5 MG capsule Take 1 Capsule by mouth every day. 90 Capsule 3    atorvastatin (LIPITOR) 10 MG Tab Take 1 Tablet by mouth every evening. 90 Tablet 3    omeprazole (PRILOSEC) 40 MG delayed-release capsule Take 1 Capsule by mouth every day. 90 Capsule 1     No current facility-administered medications for this visit.       Social History     Tobacco Use    Smoking status: Never    Smokeless tobacco: Never   Vaping Use    Vaping status: Never Used   Substance Use Topics    Alcohol use: Never    Drug use: Never       No family history on file.      Review of Systems:   Review of Systems   Constitutional:  Negative for chills, fever, malaise/fatigue and weight loss.   HENT:  Negative for congestion and sore throat.    Respiratory:  Negative for cough and shortness of breath.    Cardiovascular:  Negative for chest pain, palpitations and leg swelling.   Gastrointestinal:  Positive for constipation. Negative for abdominal pain, blood in stool, diarrhea, heartburn, melena, nausea and vomiting.   Genitourinary:  Negative for dysuria, frequency and urgency.   Musculoskeletal:  Positive for back pain. Negative for falls, myalgias and neck pain.   Neurological:  Negative for dizziness, weakness and headaches.   Psychiatric/Behavioral:  Negative for depression. The patient is not nervous/anxious.        Exam:  /78 (BP Location: Left arm, Patient Position: Sitting, BP Cuff Size: Adult)   Pulse 83   Temp 36.1 °C (97 °F) (Temporal)   Ht 1.676 m (5' 6\")   Wt 66.1 kg (145 lb 12.8 oz)   SpO2 99%  Body mass index is 23.53 kg/m².  Physical Exam  Constitutional:       General: He " is not in acute distress.     Appearance: He is normal weight. He is not toxic-appearing.   HENT:      Nose: Nose normal.      Mouth/Throat:      Mouth: Mucous membranes are moist.      Pharynx: Oropharynx is clear.   Cardiovascular:      Rate and Rhythm: Normal rate and regular rhythm.      Pulses: Normal pulses.   Pulmonary:      Effort: Pulmonary effort is normal.      Breath sounds: Normal breath sounds.   Abdominal:      General: Bowel sounds are normal. There is no distension.      Palpations: Abdomen is soft. There is no mass.      Tenderness: There is no abdominal tenderness.   Genitourinary:     Comments: Rectal exam with multiple small external hemorrhoids, no thrombosis or fissures noted.   Musculoskeletal:      Cervical back: Neck supple. No spasms or tenderness.      Thoracic back: No spasms or tenderness. Normal range of motion.      Lumbar back: Spasms and tenderness present. No bony tenderness. Negative right straight leg raise test and negative left straight leg raise test.      Right lower leg: No edema.      Left lower leg: No edema.   Neurological:      Mental Status: He is alert.         Assessment/Plan:     Acute bilateral low back pain without sciatica  Muscle spasm of back  Exam with spasm/tenderness of the lower lumbar spine, greater on the right than the left.  Neuroexam intact.  Recommend conservative management with Tylenol and ibuprofen for pain.  Will trial a 7-day course of muscle relaxant  Recommend daily stretches and heat therapy.  If symptoms fail to improve or worsen will obtain imaging studies.  Discussed a referral to PT, however, patient would like to hold off  - tizanidine (ZANAFLEX) 4 MG Tab; Take 1 Tablet by mouth every 12 hours as needed (muscle spasm).  Dispense: 14 Tablet; Refill: 0    Colon cancer screening  - Referral to GI for Colonoscopy    External hemorrhoids without complication  Recommend dietary and lifestyle changes, increase fiber and water intake, stool  softeners as needed for constipation, avoid straining and spending prolong periods of time on the toilet, sitz bath.  Trial of OTC Preparation H creams/suppositories    Insomnia, unspecified  Improved with increase dose of Trazodone 75mg daily    All imaging results and lab results and consult notes are reviewed at this visit.  Followup: Return in about 5 months (around 4/21/2025), or or sooner if needed.

## 2024-11-22 ASSESSMENT — ENCOUNTER SYMPTOMS
HEADACHES: 0
MYALGIAS: 0
DEPRESSION: 0
VOMITING: 0
COUGH: 0
SHORTNESS OF BREATH: 0
NECK PAIN: 0
FALLS: 0
HEARTBURN: 0
CONSTIPATION: 1
BLOOD IN STOOL: 0
WEIGHT LOSS: 0
SORE THROAT: 0
NAUSEA: 0
NERVOUS/ANXIOUS: 0
DIZZINESS: 0
DIARRHEA: 0
ABDOMINAL PAIN: 0
PALPITATIONS: 0
WEAKNESS: 0
FEVER: 0
BACK PAIN: 1
CHILLS: 0

## 2024-12-05 ENCOUNTER — TELEPHONE (OUTPATIENT)
Dept: INTERNAL MEDICINE | Facility: OTHER | Age: 59
End: 2024-12-05
Payer: COMMERCIAL

## 2024-12-06 NOTE — TELEPHONE ENCOUNTER
Received a call from patient's daughter but she is not on HIPAA. Recommended to have her add on HIPAA in the future, and other bilingual Shruti HALL will call patient to follow up as to what they needed.

## 2024-12-06 NOTE — TELEPHONE ENCOUNTER
Pt. Called and had a question as to why he had to do a colonoscopy. Called pt. Back and let him know it was ordered by Dr. Tomlinson at his last office visit due to constipation. Pt. Will call digestive health to schedule.

## 2024-12-19 ENCOUNTER — OFFICE VISIT (OUTPATIENT)
Dept: SLEEP MEDICINE | Facility: MEDICAL CENTER | Age: 59
End: 2024-12-19
Attending: STUDENT IN AN ORGANIZED HEALTH CARE EDUCATION/TRAINING PROGRAM
Payer: COMMERCIAL

## 2024-12-19 VITALS
OXYGEN SATURATION: 96 % | BODY MASS INDEX: 23.18 KG/M2 | DIASTOLIC BLOOD PRESSURE: 82 MMHG | HEIGHT: 66 IN | SYSTOLIC BLOOD PRESSURE: 130 MMHG | RESPIRATION RATE: 16 BRPM | HEART RATE: 74 BPM | WEIGHT: 144.2 LBS

## 2024-12-19 DIAGNOSIS — R06.83 SNORING: ICD-10-CM

## 2024-12-19 PROCEDURE — 99212 OFFICE O/P EST SF 10 MIN: CPT | Performed by: STUDENT IN AN ORGANIZED HEALTH CARE EDUCATION/TRAINING PROGRAM

## 2024-12-19 PROCEDURE — 99204 OFFICE O/P NEW MOD 45 MIN: CPT | Performed by: STUDENT IN AN ORGANIZED HEALTH CARE EDUCATION/TRAINING PROGRAM

## 2024-12-19 PROCEDURE — 3079F DIAST BP 80-89 MM HG: CPT | Performed by: STUDENT IN AN ORGANIZED HEALTH CARE EDUCATION/TRAINING PROGRAM

## 2024-12-19 PROCEDURE — 3075F SYST BP GE 130 - 139MM HG: CPT | Performed by: STUDENT IN AN ORGANIZED HEALTH CARE EDUCATION/TRAINING PROGRAM

## 2024-12-19 ASSESSMENT — FIBROSIS 4 INDEX: FIB4 SCORE: 4.5

## 2024-12-19 NOTE — PROGRESS NOTES
[facility] Consult Note     Date: 12/19/2024 / Time: 1:13 PM      Thank you for requesting a sleep medicine consultation on Hero Jamil at the sleep center. Presents today for evaluation of sleep disordered breathing.  He is referred by Kia Tomlinson D.O.  [address] [facility] [place] [address] for evaluation and treatment of sleep disorder breathing .     Patient interview conducted with  present over video    HISTORY OF PRESENT ILLNESS:     Hero Jamil is a 59 y.o. male with history of BPH, chronic neck and back pain, hyperlipidemia who presents to Sleep Clinic for sleep disordered breathing evaluation.     Denies issue with sleep onset latency but does endorse sleep maintenance insomnia.  He states he wakes up about 3 times at night and is difficult to fall back asleep.  Usually this is getting up to use the restroom.  He wife also states that he snores and he does complain of frequent morning headaches.    Works as a  at school.     As per supplemental questionnaire to be scanned or imported into chart:    Sleep Schedule  Bedtime: 11 pm, earlier around 9-10 pm  Wake time: Weekday 8am Weekend 8 am  Sleep-onset latency: unknown  Awakenings from sleep: at least 3 times  Difficulty falling back asleep: yes  Bedroom partner: yes, my wife  Naps: No     DAYTIME SYMPTOMS:   Excessive daytime sleepiness: No   Daytime fatigue: Yes  Difficulty concentrating: No   Memory problems: No   Irritability:No   Work/school performance issues: No   Sleepiness with driving: No   Caffeine/stimulant use: No   Alcohol use:No     SLEEP RELATED SYMPTOMS  Snoring: Yes  Witnessed apnea or gasping/choking: no  Dry mouth or mouth breathing: No   Sweating: No   Teeth grinding/biting: No   Morning headaches: Yes - sometimes  Refreshed Upon Awakening: Yes- sometimes     SLEEP RELATED BEHAVIORS:  Parasomnias (walking, talking, eating, violence): No   Leg kicking: No  "  Restless legs - \"urge to move\": No   Nightmares: No  Recurrent: No   Dream enactment: No      NARCOLEPSY:  Cataplexy: No   Sleep paralysis: No   Sleep attacks: No   Hypnagogic/hypnopompic hallucinations: No     MEDICAL HISTORY  See above    SURGICAL HISTORY  History reviewed. No pertinent surgical history.     FAMILY HISTORY  History reviewed. No pertinent family history.    SOCIAL HISTORY  Social History     Socioeconomic History    Marital status:    Tobacco Use    Smoking status: Never    Smokeless tobacco: Never   Vaping Use    Vaping status: Never Used   Substance and Sexual Activity    Alcohol use: Never    Drug use: Never    Sexual activity: Yes     Partners: Female     Birth control/protection: None     Comment: Monagous relationship (wife)          CURRENT MEDICATIONS  Current Outpatient Medications   Medication Sig    tizanidine (ZANAFLEX) 4 MG Tab Take 1 Tablet by mouth every 12 hours as needed (muscle spasm).    hydrocortisone 2.5 % Cream topical cream Apply 1 Application topically 2 times a day.    traZODone (DESYREL) 50 MG Tab Take 1.5 Tablets by mouth every evening. If ineffective, may increase to 2 tablets by mouth every evening.    tamsulosin (FLOMAX) 0.4 MG capsule Take 2 Capsules by mouth every day.    dutasteride (AVODART) 0.5 MG capsule Take 1 Capsule by mouth every day.    atorvastatin (LIPITOR) 10 MG Tab Take 1 Tablet by mouth every evening.    omeprazole (PRILOSEC) 40 MG delayed-release capsule Take 1 Capsule by mouth every day.       REVIEW OF SYSTEMS  Constitutional: Denies fevers, Denies weight changes  Ears/Nose/Throat/Mouth: Denies nasal congestion or sore throat   Cardiovascular: Denies chest pain  Respiratory: Denies shortness of breath, Denies cough  Gastrointestinal/Hepatic: Denies nausea, vomiting  Sleep: see HPI    Physical Examination:  Vitals/ General Appearance:   Weight/BMI: Body mass index is 23.27 kg/m².  Vitals:    12/19/24 1332   BP: 130/82   BP Location: Left arm " "  Patient Position: Sitting   BP Cuff Size: Adult   Pulse: 74   Resp: 16   SpO2: 96%   Weight: 65.4 kg (144 lb 3.2 oz)   Height: 1.676 m (5' 6\")       Pt. is alert and oriented to time, place and person. Cooperative and in no apparent distress.     Constitutional: Alert, no distress, well-groomed.  Skin: No rashes in visible areas.  Eye: Round. Conjunctiva clear, lids normal. No icterus.   ENT EXAM  Nasal alae/valves collapsible: No   Nasal septum deviation: No   Nasal turbinate hypertrophy: Left: Grade 3   Right: Grade 3  Hard palate narrow: No   Hard palate high: Yes  Soft palate/uvula (Mallampati score): 3 with elongated uvula  Tongue Scalloping: Yes  Retrognathia: Yes  Neurologic:Awake, alert and oriented x 3  Extremities: No clubbing, cyanosis, or edema     Bicarb:   Lab Results   Component Value Date/Time    CO2 22 04/25/2024 1541    CO2 24 04/22/2024 0824    CO2 24 04/27/2023 0829     TSH:   Lab Results   Component Value Date/Time    TSHULTRASEN 3.960 04/22/2024 0824     CREATININE:   Lab Results   Component Value Date/Time    CREATININE 0.75 04/25/2024 1541     VIT D:   Lab Results   Component Value Date/Time    25HYDROXY 38 02/10/2022 0811     H/H:  Lab Results   Component Value Date/Time    HEMOGLOBIN 14.9 04/25/2024 03:41 PM       ASSESSMENT AND PLAN  1. Hero Jamil has symptoms of Obstructive Sleep Apnea (GUERITA). Hero Jamil has symptoms of snoring, choking/gasping during sleep, dry mouth, unrefreshed upon awakening, fragmented sleep. These sxs interfere with activities of daily living.  Pt has risk factors for GUERITA include crowded oropharynx, retrognathia.     The pathophysiology of GUERITA and the increased risk of cardiovascular morbidity from untreated GUERITA is discussed in detail with the patient. He has sleep maintenance insomnia which can be worsened by GUERITA.      We have discussed diagnostic options including in-laboratory, attended polysomnography and home sleep testing. We " have also discussed treatment options including airway pressurization, reconstructive otolaryngologic surgery, dental appliances and weight management.     Subsequently,treatment options will be discussed based on the diagnostic study. Meanwhile, He is urged to avoid supine sleep, weight gain and alcoholic beverages since all of these can worsen GUERITA. He is cautioned against drowsy driving. If He feels sleepy while driving, advised must pull over for a break/nap, rather than persist on the road, in the interest of Pt's own safety and that of others on the road.    Plan  -  He  will be scheduled for an overnight HST to assess sleep related breathing disorder.  - Follow up 1-2 weeks after sleep study to discuss results and treatment options moving forward.  Patient is willing to start auto CPAP if indicated.  -Advised to reach out via MyChart or by phone with any questions or concerns.     2.  Regarding treatment of other past medical problems and general health maintenance,  Pt is urged to follow up with PCP.      Please note portions of this record was created using voice recognition software. I have made every reasonable attempt to correct obvious errors, but I expect that there are errors of grammar and possibly content I did not discover before finalizing the note.

## 2024-12-19 NOTE — PATIENT INSTRUCTIONS
Psychologists who conduct CBTi in the area:    Dr. Jennifer Miguel  [address] [place] [address]  [phone]    Dr. Mishel Cruz  [address] [place], [place] [address] [phone]     CBT-I Online Resources    Path to Better Sleep (free) - https://www.veterantraining.va.gov/insomnia/index.asp  This free online Cognitive Behavioral Therapy for Insomnia course, which was developed for  veterans, takes you through a sleep &quot;check-in&quot; and the various components of CBT-I,  including modifying unhelpful behaviors and unhelpful thoughts around sleep.  Insomnia  (free) - https://insomniacoach.com/  Offers a self-guided 5-week training plan designed to change sleep habits. Smart phone griffin  available.  CBT for Insomnia - Brain Gibbs, PhD, CBSM ($50-$70) -  [email or web address].cbtforinsomnia.com/  CBT for Insomnia is a five week PDF-based CBT-I program based on Dr. Brain Gibbs&#39;  twenty years of CBT-I research and clinical practice at [employer].  Go! To Sleep - [facility] ($40) - Go! to Sleep ([facility])  A 6-week online course for improving sleep that teaches you to identify and then reframe  specific thoughts and behaviors that interfere with your ability to sleep deeply.  Sleepio - Severiano Cavazos PhD, CBSM ($50-$70) - [email or web address]  Sleepio is a 6-module online CBT-I program developed with Severiano Cavazos, a renowned  insomnia clinician and researcher, that is available through some Employee Assistance  Programs.  Sleepfitness.com - Jie Tierney, PhD, CBSM ($129) - [email or web address]  A 6-week self-guided insomnia program based on Cognitive Behavioral Treatment for  Insomnia (CBTi) that is in online format. You can sign up for the 7-day free trial and learn  how CBTi can improve your sleep.  Insomnia Solved - Carlos A Salgado MD ($89) - http://www.[email or web address].XZERES/fix-  my-insomnia/  Insomnia Solved is a self-guided CBTI program created by  Carlos A Salgado M.D., a board-  certified medical doctor. The complete program includes full access to exclusive multimedia  content, including an 154-page eBook and online modules and audiofiles.  CBT-I Books  Kamila Mind: Turn Off Your Noisy Thoughts and Get a Good Night&#39;s Sleep -  Ariadna Jimenez, PhD and Pastora Banks, PhD  Accessible, enjoyable, and grounded in evidence-based cognitive behavioral therapy (CBT),  Kamila Mind directly addresses the effects of rumination?or having an overactive  brain?on your ability to sleep well. Written by two psychologists who specialize in sleep  disorders, the book contains helpful exercises and insights into how you can better manage  your thoughts at bedtime, and finally get some sleep.  Quiet Your Mind and Get to Sleep: Solutions to Insomnia for Those with Depression,  Anxiety or Chronic Pain - Pastora Banks, PhD  This workbook uses cognitive behavior therapy, which has been shown to work as well as  sleep medications and produce longer-lasting effects. Research shows that it also works  well for those whose insomnia is experienced in the context of anxiety, depression, and  chronic pain. The complete program in this book goes to the root of your insomnia and offers  the same techniques used by experienced sleep specialists.  Sleep Through Insomnia - Carlos A Salgado MD  Cognitive behavioral therapy for insomnia (CBTI) is often structured as a 6-week treatment  program that can help people who have difficulty falling asleep, staying asleep, or find that  sleep is unrefreshing. CBTI is scientifically proven, highly effective, and does not rely on    medications. CBTI has life-long benefits and most participants report improved sleep  satisfaction. Insomnia Solved is based on the core features of this treatment.  Here are some guidelines to help you establish healthy sleep habits:      Keep a consistent sleep schedule. Get up at the same time every day, even on  weekends or  during vacations.      Set a bedtime that is early enough for you to get at least 7 hours of sleep. Establish relaxing  bedtime rituals (like a warm bath or nighttime reading routine) so that your body knows it&#39;s time  to wind down.      Limit exposure to light in the evenings. The light from screens (including smartphones, tablets,  televisions, and computers) can convince your brain that it&#39;s daytime and make you feel less  tired than you actually are. Lamplight is fine. Starting 2 hours before bedtime, turn off the  screens and choose quiet, relaxing activities that you enjoy.      Use your bed only for sleep and sex. Watching TV, using your laptop, etc in bed will make you  start to associate bed with the violent movie on TV, the stressful emails on your computer, etc.  Keep your bedroom quiet, dark, and cool, and let it be a haven from the rest of your busy life.      If you don’t fall asleep after what feels like about 20 minutes (don&#39;t keep your eye on the  clock!), get out of bed. The same is true for falling asleep after waking up in the middle of the  night. Leave the bedroom, find a book to read (or other quiet activity -- no screens), get a  simple, light snack or make a cup of hot milk or herbal tea, and relax in a your favorite chair. If  you feel tired enough to go back to sleep, return to bed. If not, don&#39;t worry. You&#39;ll be sleepier at  bedtime the next night and more likely to sleep well.      Exercise regularly and maintain a healthy diet. But, avoid vigorous exercise within 2 hours of  bedtime, and don’t eat a large meal before bedtime. If you are hungry at night, eat a light,  healthy snack.      Avoid consuming caffeine after 2 pm. If you are very sensitive to caffeine, don&#39;t use it after  noon.      Avoid consuming alcohol before bedtime. Alcohol may make you feel sleepy initially but will  actually reduce the quality of your sleep and make it likelier that  you will wake up in the middle  of the night.                 Allow 1-2 hours of “wind down” before bedtime with relaxing, non-stimulating activities              Try limiting screen use and/or using blue light blockers (apps for filters) starting 2 hours  before bedtime to prevent suppression of melatonin.              Get up and out of bed within 15 minutes of your desired time in the morning to set your  internal clock.              Make sure to get early exposure to bright light.  This will help you feel more awake and  set your internal clock to make it easier to wake up in the morning.

## 2025-01-03 ENCOUNTER — APPOINTMENT (OUTPATIENT)
Dept: SLEEP MEDICINE | Facility: MEDICAL CENTER | Age: 60
End: 2025-01-03
Attending: STUDENT IN AN ORGANIZED HEALTH CARE EDUCATION/TRAINING PROGRAM
Payer: COMMERCIAL

## 2025-01-03 DIAGNOSIS — G47.33 OSA (OBSTRUCTIVE SLEEP APNEA): ICD-10-CM

## 2025-01-03 DIAGNOSIS — R06.83 SNORING: ICD-10-CM

## 2025-01-04 PROCEDURE — 95800 SLP STDY UNATTENDED: CPT | Performed by: STUDENT IN AN ORGANIZED HEALTH CARE EDUCATION/TRAINING PROGRAM

## 2025-01-16 NOTE — PROCEDURES
DIAGNOSTIC HOME SLEEP TEST (HST) REPORT WatchPAT      PATIENT ID:  NAME:  Hero Jamil  MRN:               [id number]  YOB: 1965  DATE OF STUDY: 1/4/2025      Impression:     This study shows evidence of:      1.  Moderate obstructive sleep apnea with PAT apnea hypopnea index(pAHI) of 18.9 per hour.  PAT respiratory disturbance index (pRDI) was 23.2 per hour. These findings are based on 7 channels recording of PAT signal with sleep staging, heart rate, pulse oximetry, actigraphy, body position, snoring and respiratory movement.     2. Oxygenation O2 Sat. mean O2 sat was 94%,  sandra was 91%,  and maximum O2 at 98%. O2 sat was at or  below 88% for 0 min of evaluation time. Oxygen Desaturation (>=4%) Index was 3.5/hr. AVG HR was 60 BPM.      TECHNICAL DESCRIPTION: Patient underwent home sleep apnea testing with peripheral arterial tone signal (WatchPAT™). This is a Type IV portable monitor and device per Medicare. Monitoring was done with 7 channels recording of PAT signal with sleep staging, heart rate, pulse oximetry, actigraphy, body position, snoring and respiratory movement. Prior to using the device, the patient received verbal and written instructions for its application and was provided with the help desk phone number for additional telephonic instruction with 24-hour availability of qualified personnel to answer questions.    Respiratory events:              General sleep summary: . Total recording time is 9 hours and 22 minutes and total Sleep time is 8 hours and 15 minutes. The patient spent 259.5 minutes in the supine position and 235.7 minutes in the nonsupine position.      Recommendations:    1. CPAP titration study vs Auto CPAP trial.  If initiating empiric auto CPAP would recommend 5-12 cmH2O with proper mask fit and heated tubing  2. In general patients with sleep apnea are advised to avoid alcohol and sedatives and to not operate a motor vehicle while drowsy.  In some cases alternative treatment options may prove effective in resolving sleep apnea in these options include upper airway surgery, the use of a dental orthotic or weight loss and positional therapy. Clinical correlation is required.         Arlene Gracia MD